# Patient Record
Sex: MALE | Race: OTHER | URBAN - METROPOLITAN AREA
[De-identification: names, ages, dates, MRNs, and addresses within clinical notes are randomized per-mention and may not be internally consistent; named-entity substitution may affect disease eponyms.]

---

## 2019-07-16 ENCOUNTER — INPATIENT (INPATIENT)
Facility: HOSPITAL | Age: 54
LOS: 6 days | Discharge: HOME | End: 2019-07-23
Attending: SURGERY | Admitting: SURGERY
Payer: OTHER MISCELLANEOUS

## 2019-07-16 VITALS
SYSTOLIC BLOOD PRESSURE: 141 MMHG | OXYGEN SATURATION: 96 % | HEART RATE: 78 BPM | DIASTOLIC BLOOD PRESSURE: 68 MMHG | TEMPERATURE: 98 F | RESPIRATION RATE: 18 BRPM
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
## 2019-07-16 DIAGNOSIS — Q72.811 CONGENITAL SHORTENING OF RIGHT LOWER LIMB: ICD-10-CM
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
## 2019-07-16 LAB
ALBUMIN SERPL ELPH-MCNC: 4.5 G/DL — SIGNIFICANT CHANGE UP (ref 3.5–5.2)
ALP SERPL-CCNC: 89 U/L — SIGNIFICANT CHANGE UP (ref 30–115)
ALT FLD-CCNC: 18 U/L — SIGNIFICANT CHANGE UP (ref 0–41)
ANION GAP SERPL CALC-SCNC: 14 MMOL/L — SIGNIFICANT CHANGE UP (ref 7–14)
APTT BLD: 25.6 SEC — LOW (ref 27–39.2)
AST SERPL-CCNC: 36 U/L — SIGNIFICANT CHANGE UP (ref 0–41)
BASOPHILS # BLD AUTO: 0.05 K/UL — SIGNIFICANT CHANGE UP (ref 0–0.2)
BASOPHILS NFR BLD AUTO: 0.4 % — SIGNIFICANT CHANGE UP (ref 0–1)
BILIRUB SERPL-MCNC: 0.3 MG/DL — SIGNIFICANT CHANGE UP (ref 0.2–1.2)
BLD GP AB SCN SERPL QL: SIGNIFICANT CHANGE UP
BUN SERPL-MCNC: 12 MG/DL — SIGNIFICANT CHANGE UP (ref 10–20)
CALCIUM SERPL-MCNC: 9.1 MG/DL — SIGNIFICANT CHANGE UP (ref 8.5–10.1)
CHLORIDE SERPL-SCNC: 106 MMOL/L — SIGNIFICANT CHANGE UP (ref 98–110)
CO2 SERPL-SCNC: 21 MMOL/L — SIGNIFICANT CHANGE UP (ref 17–32)
CREAT SERPL-MCNC: 0.9 MG/DL — SIGNIFICANT CHANGE UP (ref 0.7–1.5)
EOSINOPHIL # BLD AUTO: 0.1 K/UL — SIGNIFICANT CHANGE UP (ref 0–0.7)
EOSINOPHIL NFR BLD AUTO: 0.9 % — SIGNIFICANT CHANGE UP (ref 0–8)
ETHANOL SERPL-MCNC: <10 MG/DL — SIGNIFICANT CHANGE UP
GLUCOSE SERPL-MCNC: 137 MG/DL — HIGH (ref 70–99)
HCT VFR BLD CALC: 42.2 % — SIGNIFICANT CHANGE UP (ref 42–52)
HGB BLD-MCNC: 14.2 G/DL — SIGNIFICANT CHANGE UP (ref 14–18)
IMM GRANULOCYTES NFR BLD AUTO: 0.4 % — HIGH (ref 0.1–0.3)
INR BLD: 0.96 RATIO — SIGNIFICANT CHANGE UP (ref 0.65–1.3)
LACTATE SERPL-SCNC: 2.1 MMOL/L — SIGNIFICANT CHANGE UP (ref 0.5–2.2)
LIDOCAIN IGE QN: 28 U/L — SIGNIFICANT CHANGE UP (ref 7–60)
LYMPHOCYTES # BLD AUTO: 1.31 K/UL — SIGNIFICANT CHANGE UP (ref 1.2–3.4)
LYMPHOCYTES # BLD AUTO: 11.2 % — LOW (ref 20.5–51.1)
MCHC RBC-ENTMCNC: 28.8 PG — SIGNIFICANT CHANGE UP (ref 27–31)
MCHC RBC-ENTMCNC: 33.6 G/DL — SIGNIFICANT CHANGE UP (ref 32–37)
MCV RBC AUTO: 85.6 FL — SIGNIFICANT CHANGE UP (ref 80–94)
MONOCYTES # BLD AUTO: 0.81 K/UL — HIGH (ref 0.1–0.6)
MONOCYTES NFR BLD AUTO: 6.9 % — SIGNIFICANT CHANGE UP (ref 1.7–9.3)
NEUTROPHILS # BLD AUTO: 9.41 K/UL — HIGH (ref 1.4–6.5)
NEUTROPHILS NFR BLD AUTO: 80.2 % — HIGH (ref 42.2–75.2)
NRBC # BLD: 0 /100 WBCS — SIGNIFICANT CHANGE UP (ref 0–0)
PLATELET # BLD AUTO: 271 K/UL — SIGNIFICANT CHANGE UP (ref 130–400)
POTASSIUM SERPL-MCNC: 5.3 MMOL/L — HIGH (ref 3.5–5)
POTASSIUM SERPL-SCNC: 5.3 MMOL/L — HIGH (ref 3.5–5)
PROT SERPL-MCNC: 7.3 G/DL — SIGNIFICANT CHANGE UP (ref 6–8)
PROTHROM AB SERPL-ACNC: 11 SEC — SIGNIFICANT CHANGE UP (ref 9.95–12.87)
RBC # BLD: 4.93 M/UL — SIGNIFICANT CHANGE UP (ref 4.7–6.1)
RBC # FLD: 12.8 % — SIGNIFICANT CHANGE UP (ref 11.5–14.5)
SODIUM SERPL-SCNC: 141 MMOL/L — SIGNIFICANT CHANGE UP (ref 135–146)
WBC # BLD: 11.73 K/UL — HIGH (ref 4.8–10.8)
WBC # FLD AUTO: 11.73 K/UL — HIGH (ref 4.8–10.8)

## 2019-07-16 PROCEDURE — 72170 X-RAY EXAM OF PELVIS: CPT | Mod: 26

## 2019-07-16 PROCEDURE — 73562 X-RAY EXAM OF KNEE 3: CPT | Mod: 26,50

## 2019-07-16 PROCEDURE — 71045 X-RAY EXAM CHEST 1 VIEW: CPT | Mod: 26

## 2019-07-16 PROCEDURE — 99285 EMERGENCY DEPT VISIT HI MDM: CPT

## 2019-07-16 PROCEDURE — 73630 X-RAY EXAM OF FOOT: CPT | Mod: 26,50

## 2019-07-16 PROCEDURE — 73610 X-RAY EXAM OF ANKLE: CPT | Mod: 26,50

## 2019-07-16 PROCEDURE — 72100 X-RAY EXAM L-S SPINE 2/3 VWS: CPT | Mod: 26

## 2019-07-16 RX ORDER — PANTOPRAZOLE SODIUM 20 MG/1
40 TABLET, DELAYED RELEASE ORAL
Refills: 0 | Status: DISCONTINUED | OUTPATIENT
Start: 2019-07-16 | End: 2019-07-16

## 2019-07-16 RX ORDER — HEPARIN SODIUM 5000 [USP'U]/ML
5000 INJECTION INTRAVENOUS; SUBCUTANEOUS EVERY 12 HOURS
Refills: 0 | Status: DISCONTINUED | OUTPATIENT
Start: 2019-07-16 | End: 2019-07-23

## 2019-07-16 RX ORDER — IBUPROFEN 200 MG
800 TABLET ORAL ONCE
Refills: 0 | Status: COMPLETED | OUTPATIENT
Start: 2019-07-16 | End: 2019-07-16

## 2019-07-16 RX ORDER — ACETAMINOPHEN 500 MG
325 TABLET ORAL EVERY 6 HOURS
Refills: 0 | Status: DISCONTINUED | OUTPATIENT
Start: 2019-07-16 | End: 2019-07-17

## 2019-07-16 RX ORDER — PANTOPRAZOLE SODIUM 20 MG/1
40 TABLET, DELAYED RELEASE ORAL
Refills: 0 | Status: DISCONTINUED | OUTPATIENT
Start: 2019-07-16 | End: 2019-07-23

## 2019-07-16 RX ORDER — OXYCODONE AND ACETAMINOPHEN 5; 325 MG/1; MG/1
1 TABLET ORAL EVERY 6 HOURS
Refills: 0 | Status: DISCONTINUED | OUTPATIENT
Start: 2019-07-16 | End: 2019-07-17

## 2019-07-16 RX ORDER — IBUPROFEN 200 MG
200 TABLET ORAL EVERY 8 HOURS
Refills: 0 | Status: DISCONTINUED | OUTPATIENT
Start: 2019-07-16 | End: 2019-07-17

## 2019-07-16 RX ORDER — MORPHINE SULFATE 50 MG/1
4 CAPSULE, EXTENDED RELEASE ORAL ONCE
Refills: 0 | Status: DISCONTINUED | OUTPATIENT
Start: 2019-07-16 | End: 2019-07-16

## 2019-07-16 RX ADMIN — MORPHINE SULFATE 4 MILLIGRAM(S): 50 CAPSULE, EXTENDED RELEASE ORAL at 18:43

## 2019-07-16 RX ADMIN — Medication 800 MILLIGRAM(S): at 21:06

## 2019-07-16 NOTE — ED PROVIDER NOTE - CLINICAL SUMMARY MEDICAL DECISION MAKING FREE TEXT BOX
54 yo man with fall from ladder with bilateral LE injuries.  Will need admission due to inability to ambulate and perform ADL's.   Trauma aware and admitted patient.

## 2019-07-16 NOTE — ED PROVIDER NOTE - PHYSICAL EXAMINATION
CONST: Pt appears to be uncomfortable  EYES: PERRL, EOMI, Sclera and conjunctiva clear.   ENT: No nasal discharge. Oropharynx normal appearing, no erythema or exudates. No abscess or swelling. Uvula midline.   NECK: Non-tender, no meningeal signs. normal ROM. supple   CARD: S1 S2; No jvd  RESP: Equal BS B/L, No wheezes, rhonchi or rales. No distress  GI: Soft, non-tender, non-distended. no cva tenderness. normal BS  MS: Decreased ROM in LE extremities. pulses 2 +. No Midline tenderness  SKIN: Ecchymosis and swelling of b/l ankles  NEURO: A&Ox3, No focal deficits. Strength 4/5 with no sensory deficits. CONST: Pt appears to be uncomfortable  EYES: PERRL, EOMI, Sclera and conjunctiva clear.   ENT: No nasal discharge. Oropharynx normal appearing, no erythema or exudates. No abscess or swelling. Uvula midline.   NECK: Non-tender, no meningeal signs. normal ROM. supple   CARD: S1 S2; No jvd  RESP: Equal BS B/L, No wheezes, rhonchi or rales. No distress  GI: Soft, non-tender, non-distended. no cva tenderness. normal BS  MS: No LS midline tenderness. Moderate swelling and tenderness R lateral malleolus with ecchymosis tracking lateral dorsum of foot. Moderate L lateral malleolus tenderness and swelling. Unable to bear weight.   SKIN: Ecchymosis and swelling of b/l ankles  NEURO: A&Ox3, No focal deficits. Strength 4/5 with no sensory deficits.

## 2019-07-16 NOTE — ED PROVIDER NOTE - ATTENDING CONTRIBUTION TO CARE
I personally evaluated the patient. I reviewed the Resident’s or Physician Assistant’s note (as assigned above), and agree with the findings and plan except as documented in my note.  52 yo man with fall from ladder about 15 feet and now with bilateral foot and ankle pain and inability to ambulate.  XRAYs reveal fibula fracture and possible calcaneus fracture.  Will need admission due to his inability to ambulate and perform ADL's.  Trauma consulted.  Ortho consulted.  Patient admitted to trauma service.

## 2019-07-16 NOTE — ED PROVIDER NOTE - OBJECTIVE STATEMENT
53y M no pmh presents for evaluation s/p fall. Pt states he was working on a house when fell 15ft landing on his feet and falling forward catching himself with his hands, now presents with B/L foot, ankle pain and R knee pain, worse with movement, no relieving factors, no radiation. Associated swelling and inability to ambulate. 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 Pt states he was working on a house when fell 15ft landing on his feet and falling forward catching himself with his hands, now presents with B/L foot, ankle pain and R knee pain, worse with movement, no relieving factors, no radiation. Associated swelling and inability to ambulate. Denies head injury, back pain, weakness, numbness, n/v

## 2019-07-16 NOTE — ED PROVIDER NOTE - PROGRESS NOTE DETAILS
Discussed with radiology for official reads that are still pending. Walked to radiology reading room for reads, questionable R calcaneal lucency, L distal fibula fracture, medial tibial-mortise widening. Discussed with ortho for consult. Trauma consult placed as well for bilateral lower extremity traumatic injuries requiring admission as patient cannot walk. I was directly involved in the management of this patient. Case was discussed with PA Fellow Chloé Dr. Meyers recommending admission to trauma service.

## 2019-07-16 NOTE — ED PROVIDER NOTE - NS ED ROS FT
Constitutional: (-) fever  Eyes/ENT: (-) blurry vision, (-) epistaxis  Cardiovascular: (-) chest pain, (-) syncope  Respiratory: (-) cough, (-) shortness of breath  Gastrointestinal: (-) vomiting, (-) diarrhea  Musculoskeletal: (+) joint pain, (-) neck pain, (-) back pain  Integumentary: (+) swelling, (-) rash  Neurological: (-) headache, (-) altered mental status  Psychiatric: (-) hallucinations  Allergic/Immunologic: (-) pruritus

## 2019-07-17 LAB
ANION GAP SERPL CALC-SCNC: 13 MMOL/L — SIGNIFICANT CHANGE UP (ref 7–14)
BASOPHILS # BLD AUTO: 0.04 K/UL — SIGNIFICANT CHANGE UP (ref 0–0.2)
BASOPHILS NFR BLD AUTO: 0.5 % — SIGNIFICANT CHANGE UP (ref 0–1)
BUN SERPL-MCNC: 14 MG/DL — SIGNIFICANT CHANGE UP (ref 10–20)
CALCIUM SERPL-MCNC: 9.1 MG/DL — SIGNIFICANT CHANGE UP (ref 8.5–10.1)
CHLORIDE SERPL-SCNC: 103 MMOL/L — SIGNIFICANT CHANGE UP (ref 98–110)
CO2 SERPL-SCNC: 24 MMOL/L — SIGNIFICANT CHANGE UP (ref 17–32)
CREAT SERPL-MCNC: 0.7 MG/DL — SIGNIFICANT CHANGE UP (ref 0.7–1.5)
EOSINOPHIL # BLD AUTO: 0.13 K/UL — SIGNIFICANT CHANGE UP (ref 0–0.7)
EOSINOPHIL NFR BLD AUTO: 1.6 % — SIGNIFICANT CHANGE UP (ref 0–8)
GLUCOSE SERPL-MCNC: 125 MG/DL — HIGH (ref 70–99)
HCT VFR BLD CALC: 37.1 % — LOW (ref 42–52)
HCV AB S/CO SERPL IA: 0.11 S/CO — SIGNIFICANT CHANGE UP (ref 0–0.99)
HCV AB SERPL-IMP: SIGNIFICANT CHANGE UP
HGB BLD-MCNC: 12.6 G/DL — LOW (ref 14–18)
IMM GRANULOCYTES NFR BLD AUTO: 0.4 % — HIGH (ref 0.1–0.3)
LYMPHOCYTES # BLD AUTO: 2.05 K/UL — SIGNIFICANT CHANGE UP (ref 1.2–3.4)
LYMPHOCYTES # BLD AUTO: 25 % — SIGNIFICANT CHANGE UP (ref 20.5–51.1)
MAGNESIUM SERPL-MCNC: 2.2 MG/DL — SIGNIFICANT CHANGE UP (ref 1.8–2.4)
MCHC RBC-ENTMCNC: 29 PG — SIGNIFICANT CHANGE UP (ref 27–31)
MCHC RBC-ENTMCNC: 34 G/DL — SIGNIFICANT CHANGE UP (ref 32–37)
MCV RBC AUTO: 85.5 FL — SIGNIFICANT CHANGE UP (ref 80–94)
MONOCYTES # BLD AUTO: 0.92 K/UL — HIGH (ref 0.1–0.6)
MONOCYTES NFR BLD AUTO: 11.2 % — HIGH (ref 1.7–9.3)
NEUTROPHILS # BLD AUTO: 5.04 K/UL — SIGNIFICANT CHANGE UP (ref 1.4–6.5)
NEUTROPHILS NFR BLD AUTO: 61.3 % — SIGNIFICANT CHANGE UP (ref 42.2–75.2)
NRBC # BLD: 0 /100 WBCS — SIGNIFICANT CHANGE UP (ref 0–0)
PHOSPHATE SERPL-MCNC: 4.1 MG/DL — SIGNIFICANT CHANGE UP (ref 2.1–4.9)
PLATELET # BLD AUTO: 228 K/UL — SIGNIFICANT CHANGE UP (ref 130–400)
POTASSIUM SERPL-MCNC: 3.8 MMOL/L — SIGNIFICANT CHANGE UP (ref 3.5–5)
POTASSIUM SERPL-SCNC: 3.8 MMOL/L — SIGNIFICANT CHANGE UP (ref 3.5–5)
RBC # BLD: 4.34 M/UL — LOW (ref 4.7–6.1)
RBC # FLD: 12.9 % — SIGNIFICANT CHANGE UP (ref 11.5–14.5)
SODIUM SERPL-SCNC: 140 MMOL/L — SIGNIFICANT CHANGE UP (ref 135–146)
WBC # BLD: 8.21 K/UL — SIGNIFICANT CHANGE UP (ref 4.8–10.8)
WBC # FLD AUTO: 8.21 K/UL — SIGNIFICANT CHANGE UP (ref 4.8–10.8)

## 2019-07-17 PROCEDURE — 73590 X-RAY EXAM OF LOWER LEG: CPT | Mod: 26,50

## 2019-07-17 PROCEDURE — 73610 X-RAY EXAM OF ANKLE: CPT | Mod: 26,LT

## 2019-07-17 PROCEDURE — 73552 X-RAY EXAM OF FEMUR 2/>: CPT | Mod: 26,50

## 2019-07-17 PROCEDURE — 99221 1ST HOSP IP/OBS SF/LOW 40: CPT

## 2019-07-17 RX ORDER — ACETAMINOPHEN 500 MG
650 TABLET ORAL EVERY 6 HOURS
Refills: 0 | Status: DISCONTINUED | OUTPATIENT
Start: 2019-07-17 | End: 2019-07-23

## 2019-07-17 RX ORDER — IBUPROFEN 200 MG
600 TABLET ORAL EVERY 6 HOURS
Refills: 0 | Status: DISCONTINUED | OUTPATIENT
Start: 2019-07-17 | End: 2019-07-23

## 2019-07-17 RX ORDER — OXYCODONE HYDROCHLORIDE 5 MG/1
5 TABLET ORAL EVERY 4 HOURS
Refills: 0 | Status: DISCONTINUED | OUTPATIENT
Start: 2019-07-17 | End: 2019-07-23

## 2019-07-17 RX ADMIN — OXYCODONE AND ACETAMINOPHEN 1 TABLET(S): 5; 325 TABLET ORAL at 06:47

## 2019-07-17 RX ADMIN — Medication 600 MILLIGRAM(S): at 17:48

## 2019-07-17 RX ADMIN — OXYCODONE AND ACETAMINOPHEN 1 TABLET(S): 5; 325 TABLET ORAL at 06:20

## 2019-07-17 RX ADMIN — PANTOPRAZOLE SODIUM 40 MILLIGRAM(S): 20 TABLET, DELAYED RELEASE ORAL at 05:48

## 2019-07-17 RX ADMIN — HEPARIN SODIUM 5000 UNIT(S): 5000 INJECTION INTRAVENOUS; SUBCUTANEOUS at 17:48

## 2019-07-17 RX ADMIN — Medication 600 MILLIGRAM(S): at 11:24

## 2019-07-17 RX ADMIN — HEPARIN SODIUM 5000 UNIT(S): 5000 INJECTION INTRAVENOUS; SUBCUTANEOUS at 05:48

## 2019-07-17 NOTE — PROGRESS NOTE ADULT - ASSESSMENT
52 y/o male s/p fall from ladder with left ankle fx and right lateral ankle sprain    assessment of patient was done via  over phone ( ID = [id number], name = Simba). We discussed his injuries, the plan, and any concerns that him or his family had were addressed.     Plan:   - Crutches  - Pain management     - Ortho to follow as an outpatient   - PT

## 2019-07-17 NOTE — PROGRESS NOTE ADULT - SUBJECTIVE AND OBJECTIVE BOX
GENERAL SURGERY PROGRESS NOTE     ***assessment of patient was done via  over phone ( ID = [id number], name = Simba)***    DEV BLAND  53y  Male  Hospital day :  POD:  Procedure:   OVERNIGHT EVENTS: No acute events     T(F): 97 (07-17-19 @ 07:30), Max: 97.9 (07-17-19 @ 02:54)  HR: 72 (07-17-19 @ 07:30) (65 - 78)  BP: 109/60 (07-17-19 @ 07:30) (109/60 - 141/68)  ABP: --  ABP(mean): --  RR: 18 (07-17-19 @ 07:30) (18 - 19)  SpO2: 96% (07-16-19 @ 17:35) (96% - 96%)    DIET/FLUIDS: regular       GI proph:  pantoprazole    Tablet 40 milliGRAM(s) Oral before breakfast    AC/ proph: heparin  Injectable 5000 Unit(s) SubCutaneous every 12 hours    ABx: none    PHYSICAL EXAM:  GENERAL: NAD, well-appearing  CHEST/LUNG: Clear to auscultation bilaterally  HEART: Regular rate and rhythm  ABDOMEN: Soft, Nontender, Nondistended;   EXTREMITIES:  No clubbing, cyanosis, or edema      LABS  Labs:  CAPILLARY BLOOD GLUCOSE                              14.2   11.73 )-----------( 271      ( 16 Jul 2019 18:30 )             42.2       Auto Neutrophil %: 80.2 % (07-16-19 @ 18:30)  Auto Immature Granulocyte %: 0.4 % (07-16-19 @ 18:30)    07-16    141  |  106  |  12  ----------------------------<  137<H>  5.3<H>   |  21  |  0.9      Calcium, Total Serum: 9.1 mg/dL (07-16-19 @ 18:30)      LFTs:             7.3  | 0.3  | 36       ------------------[89      ( 16 Jul 2019 18:30 )  4.5  | x    | 18          Lipase:28     Amylase:x         Lactate, Blood: 2.1 mmol/L (07-16-19 @ 18:30)      Coags:     11.00  ----< 0.96    ( 16 Jul 2019 18:30 )     25.6              Alcohol, Blood: <10 mg/dL (07-16-19 @ 18:30)            RADIOLOGY & ADDITIONAL TESTS:   < from: Xray Ankle Complete 3 Views, Left (07.17.19 @ 05:59) >  Findings/  impression: Since prior patient is status post plaster cast placement   with anatomic reduction for Pearson B distal fibular fracture with   reduction of the medial clear space widening. Overlying cast limits   assessment of fine underlying bone detail.    < end of copied text >

## 2019-07-17 NOTE — CONSULT NOTE ADULT - ASSESSMENT
IMPRESSION: Rehab of right ankle sprain / left ankle fx    PRECAUTIONS: [  ] Cardiac  [  ] Respiratory  [  ] Seizures [  ] Contact Isolation  [  ] Droplet Isolation  [  ] Other    Weight Bearing Status:  NWB LLE                                     WBAT RLE    RECOMMENDATION:    Out of Bed to Chair     DVT/Decubiti Prophylaxis    REHAB PLAN:     [ x  ] Bedside P/T 3-5 times a week   [   ]   Bedside O/T  2-3 times a week             [   ] No Rehab Therapy Indicated                   [   ]  Speech Therapy   Conditioning/ROM                                    ADL  Bed Mobility                                               Conditioning/ROM  Transfers                                                     Bed Mobility  Sitting /Standing Balance                         Transfers                                        Gait Training                                               Sitting/Standing Balance  Stair Training [   ]Applicable                    Home equipment Eval                                                                        Splinting  [   ] Only      GOALS:   ADL   [   ]   Independent                    Transfers  [ x  ] Independent                          Ambulation  [ x  ] Independent     [ x   ] With device                            [   ]  CG                                                         [   ]  CG                                                                  [   ] CG                            [    ] Min A                                                   [   ] Min A                                                              [   ] Min  A          DISCHARGE PLAN:   [   ]  Good candidate for Intensive Rehabilitation/Hospital based                                             Will tolerate 3hrs Intensive Rehab Daily                                       [    ]  Short Term Rehab in Skilled Nursing Facility                                       [   x ]  Home with Outpatient or  services                                         [    ]  Possible Candidate for Intensive Hospital based Rehab

## 2019-07-17 NOTE — CONSULT NOTE ADULT - SUBJECTIVE AND OBJECTIVE BOX
HPI:  53y M with no PMH presents for evaluation s/p fall. Pt states he was working on a house when fell 12ft landing on his feet and falling forward catching himself with his hands, now presents with B/L foot, ankle pain and R knee pain, worse with movement, no relieving factors, no radiation. Associated swelling and inability to ambulate. Denies head injury, back pain, weakness, numbness, n/v (17 Jul 2019 00:16). Trauma w/u + for right ankle sprain and left ankle fx, nwb as per ortho      PAST MEDICAL & SURGICAL HISTORY:  No pertinent past medical history  No significant past surgical history      Hospital Course:    TODAY'S SUBJECTIVE & REVIEW OF SYMPTOMS:     Constitutional WNL   Cardio WNL   Resp WNL   GI WNL  Heme WNL  Endo WNL  Skin WNL  MSK pain  Neuro WNL  Cognitive WNL  Psych WNL      MEDICATIONS  (STANDING):  heparin  Injectable 5000 Unit(s) SubCutaneous every 12 hours  pantoprazole    Tablet 40 milliGRAM(s) Oral before breakfast    MEDICATIONS  (PRN):  acetaminophen   Tablet .. 325 milliGRAM(s) Oral every 6 hours PRN Mild Pain (1 - 3)  ibuprofen  Tablet. 200 milliGRAM(s) Oral every 8 hours PRN Mild Pain (1 - 3)  oxyCODONE    5 mG/acetaminophen 325 mG 1 Tablet(s) Oral every 6 hours PRN Moderate Pain (4 - 6)      FAMILY HISTORY:  No pertinent family history in first degree relatives      Allergies    penicillin (Unknown)    Intolerances        SOCIAL HISTORY:    [  ] Etoh  [  ] Smoking  [  ] Substance abuse     Home Environment:  [  ] Home Alone  [x  ] Lives with Family  [  ] Home Health Aid    Dwelling:  [  ] Apartment  [ x ] Private House  [  ] Adult Home  [  ] Skilled Nursing Facility      [  ] Short Term  [  ] Long Term  [ x ] Stairs       Elevator [  ]    FUNCTIONAL STATUS PTA: (Check all that apply)  Ambulation: [ x  ]Independent    [  ] Dependent     [  ] Non-Ambulatory  Assistive Device: [  ] SA Cane  [  ]  Q Cane  [  ] Walker  [  ]  Wheelchair  ADL : [x  ] Independent  [  ]  Dependent       Vital Signs Last 24 Hrs  T(C): 36.1 (17 Jul 2019 07:30), Max: 36.6 (16 Jul 2019 17:35)  T(F): 97 (17 Jul 2019 07:30), Max: 97.9 (17 Jul 2019 02:54)  HR: 72 (17 Jul 2019 07:30) (65 - 78)  BP: 109/60 (17 Jul 2019 07:30) (109/60 - 141/68)  BP(mean): --  RR: 18 (17 Jul 2019 07:30) (18 - 19)  SpO2: 96% (16 Jul 2019 17:35) (96% - 96%)      PHYSICAL EXAM: Alert & Oriented X3  GENERAL: NAD, well-groomed, well-developed  HEAD:  Atraumatic, Normocephalic  CHEST/LUNG: Clear   HEART: S1S2+  ABDOMEN: Soft, Nontender  EXTREMITIES:  no calf tenderness    NERVOUS SYSTEM:  Cranial Nerves 2-12 intact [  ] Abnormal  [  ]  ROM: WFL all extremities [  ]  Abnormal [ x ]limited shraddha ankle  Motor Strength: WFL all extremities  [  ]  Abnormal [x  ]limited shraddha ankle  Sensation: intact to light touch [ x ] Abnormal [  ]  Reflexes: Symmetric [  ]  Abnormal [  ]    FUNCTIONAL STATUS:  Bed Mobility: Independent [  ]  Supervision [  ]  Needs Assistance [ x ]  N/A [  ]  Transfers: Independent [  ]  Supervision [  ]  Needs Assistance [x  ]  N/A [  ]   Ambulation: Independent [  ]  Supervision [  ]  Needs Assistance [  ]  N/A [  ]  ADL: Independent [  ] Requires Assistance [  ] N/A [  ]      LABS:                        14.2   11.73 )-----------( 271      ( 16 Jul 2019 18:30 )             42.2     07-16    141  |  106  |  12  ----------------------------<  137<H>  5.3<H>   |  21  |  0.9    Ca    9.1      16 Jul 2019 18:30    TPro  7.3  /  Alb  4.5  /  TBili  0.3  /  DBili  x   /  AST  36  /  ALT  18  /  AlkPhos  89  07-16    PT/INR - ( 16 Jul 2019 18:30 )   PT: 11.00 sec;   INR: 0.96 ratio         PTT - ( 16 Jul 2019 18:30 )  PTT:25.6 sec      RADIOLOGY & ADDITIONAL STUDIES:    Assesment:

## 2019-07-17 NOTE — CONSULT NOTE ADULT - SUBJECTIVE AND OBJECTIVE BOX
53M who presents to ED after fall. States he was working on a house while on a ladder when he leaned over too much and fell off his ladder. He states he landed on his feet. Denies head injury or other injuries. Endorses pain in lateral aspect of R ankle and pain throughout left ankle. Denies pain anywhere else. Of note, patient has pes cavus deformity of R foot at baseline. Denies numbness/tingling. Orthopedics consulted for ankle injuries.    PAST MEDICAL & SURGICAL HISTORY:  No pertinent past medical history  No significant past surgical history    Home Medications: denies    Allergies  penicillin (Unknown)    Phys Ex:  NAD, alert and oriented    RLE  pes cavus deformity  skin intact, swelling along lateral aspect of ankle  ecchymosis along lateral ankle  TTP along lateral aspect, no TTP anteriorly, posteriorly, medially  no TTP along tibia  SILT s/s/sp/dp/t  +EHL/FHL/EDC  DP 2+    LLE  ankle swelling, skin intact  TTP medially and laterally  SILT s/s/sp/dp/t  +EHL/FHL/EDC  DP 2+    Imaging: left ankle SER fx      A/P: 53M w/ L ankle fx, R lateral ankle sprain    AO splint applied  NWB LLE, splint precautions  RLE cam boot, acewrap for now until cam boot arrives; WBAT  pain control  pre-op trauma/medical clearance  EKG  CXR  pre-op labs  plan for OR if patient remains admitted 53M who presents to ED after fall. States he was working on a house while on a ladder when he leaned over too much and fell off his ladder. He states he landed on his feet. Denies head injury or other injuries. Endorses pain in lateral aspect of R ankle and pain throughout left ankle. Denies pain anywhere else. Of note, patient has pes cavus deformity of R foot at baseline. Denies numbness/tingling. Orthopedics consulted for ankle injuries.    PAST MEDICAL & SURGICAL HISTORY:  No pertinent past medical history  No significant past surgical history    Home Medications: denies    Allergies  penicillin (Unknown)    Phys Ex:  NAD, alert and oriented    RLE  pes cavus deformity  skin intact, swelling along lateral aspect of ankle  ecchymosis along lateral ankle  TTP along lateral aspect, no TTP anteriorly, posteriorly, medially  no TTP along tibia  SILT s/s/sp/dp/t  +EHL/FHL/EDC  DP 2+    LLE  ankle swelling, skin intact  TTP medially and laterally  SILT s/s/sp/dp/t  +EHL/FHL/EDC  DP 2+    Imaging: left ankle SER fx      A/P: 53M w/ L ankle fx, R lateral ankle sprain    AO splint applied  NWB LLE, splint precautions  WBAT RLE  crutch training  pain control  plan for OR if patient remains admitted, otherwise can plan for surgery as outpt 53M who presents to ED after fall. States he was working on a house while on a ladder when he leaned over too much and fell off his ladder. He states he landed on his feet. Denies head injury or other injuries. Endorses pain in lateral aspect of R ankle and pain throughout left ankle. Denies pain anywhere else. Of note, patient has pes cavus deformity of R foot at baseline. Denies numbness/tingling. Orthopedics consulted for ankle injuries.    PAST MEDICAL & SURGICAL HISTORY:  No pertinent past medical history  No significant past surgical history    Home Medications: denies    Allergies  penicillin (Unknown)    Phys Ex:  NAD, alert and oriented    RLE  pes cavus deformity  skin intact, swelling along lateral aspect of ankle  ecchymosis along lateral ankle  TTP along lateral aspect, no TTP anteriorly, posteriorly, medially  no TTP along tibia  SILT s/s/sp/dp/t  +EHL/FHL/EDC  DP 2+    LLE  ankle swelling, skin intact  TTP medially and laterally  SILT s/s/sp/dp/t  +EHL/FHL/EDC  DP 2+    Imaging: left ankle SER fx      A/P: 53M w/ L ankle fx, R lateral ankle sprain    AO splint applied  NWB LLE, splint precautions  WBAT RLE  crutch training  pain control  plan for OR as outpt 53M who presents to ED after fall. States he was working on a house while on a ladder when he leaned over too much and fell off his ladder. He states he landed on his feet. Denies head injury or other injuries. Endorses pain in lateral aspect of R ankle and pain throughout left ankle. Denies pain anywhere else. Of note, patient has pes cavus deformity of R foot at baseline. Denies numbness/tingling. Orthopedics consulted for ankle injuries.    PAST MEDICAL & SURGICAL HISTORY:  No pertinent past medical history  No significant past surgical history    Home Medications: denies    Allergies  penicillin (Unknown)    Phys Ex:  NAD, alert and oriented    RLE  pes cavus deformity  skin intact, swelling along lateral aspect of ankle  ecchymosis along lateral ankle  TTP along lateral aspect, no TTP anteriorly, posteriorly, medially  no TTP along tibia  SILT s/s/sp/dp/t  +EHL/FHL/EDC  DP 2+    LLE  ankle swelling, skin intact  TTP medially and laterally  SILT s/s/sp/dp/t  +EHL/FHL/EDC  DP 2+    Imaging: left ankle SER fx      A/P: 53M w/ L ankle fx, R lateral ankle sprain NON OP CALCANEUS AND NAVICULAR FX    AO splint applied  NWB LLE, splint precautions  NWB RLE CAM BOOT MAY BE RXED FROM [facility]   WHEEL CHAIR TRANSFERS FOR NOW   pain control  plan for OR as outpt

## 2019-07-17 NOTE — H&P ADULT - NSHPPHYSICALEXAM_GEN_ALL_CORE
PHYSICAL EXAM:  GENERAL: NAD, well-appearing  CHEST/LUNG: Clear to auscultation bilaterally  HEART: Regular rate and rhythm  ABDOMEN: Soft, Nontender, Nondistended;   EXTREMITIES:  No clubbing, cyanosis, or edema. B/L ankle swelling and tenderness PHYSICAL EXAM:  GENERAL: NAD, well-appearing  CHEST/LUNG: Clear to auscultation bilaterally  HEART: Regular rate and rhythm  ABDOMEN: Soft, Nontender, Nondistended;   EXTREMITIES:  No clubbing, cyanosis, or edema. B/L ankle swelling and tenderness and eccymyosis

## 2019-07-17 NOTE — H&P ADULT - ATTENDING COMMENTS
52yo male presents after falling 12 feet, complains of pain in bilateral feet, ankles, and right knee. Sustained left fibular fracture - splinted, ortho consult. F/U imaging. Pending tertiary survey.

## 2019-07-17 NOTE — H&P ADULT - HISTORY OF PRESENT ILLNESS
53y M with no PMH presents for evaluation s/p fall. Pt states he was working on a house when fell 12ft landing on his feet and falling forward catching himself with his hands, now presents with B/L foot, ankle pain and R knee pain, worse with movement, no relieving factors, no radiation. Associated swelling and inability to ambulate. Denies head injury, back pain, weakness, numbness, n/v

## 2019-07-17 NOTE — H&P ADULT - NSHPLABSRESULTS_GEN_ALL_CORE
54 yo M with no PMH s/p fall.     PLAN:     -F/U ortho recommendations  -F/U PT rehab consult  -F/U physiatry consult  -Pain control: Tylenol, Ibuprofen, Oxycodone  -DVT ppx: HSQ  -GI ppx: protonix 54 yo M with no PMH s/p fall.     PLAN:     -F/U ortho recommendations  -F/U PT rehab consult  -F/U physiatry consult  -Pain control: Tylenol, Ibuprofen, Oxycodone  -DVT ppx: HSQ  -GI ppx: protonix    Senior Trauma Resident Note  54 yo male sp fall off ladder 15 ft on legs -ht -loc -ac  left fibular fx - fu ortho (at bedside ) , splinted   right ankle swelling - f/u ortho  pain control pt rehab  to floor  Airway intact  Bilateral Breath Sounds  Palpable pulses in 4 ext  GCS 15, PERRL, WALTON  VSS  No Subq emphysema, abdominal tenderness,  or pelvic instability   CXR and PXR negative  Will Dispo accordingly  Plan as above d/w Dr Ayo Meyers

## 2019-07-17 NOTE — PATIENT PROFILE ADULT - LANGUAGE ASSISTANCE NEEDED
No-Patient/Caregiver offered and refused free interpretation services./Pt refused, request daughter Lela at bedside for translation

## 2019-07-18 LAB
ANION GAP SERPL CALC-SCNC: 11 MMOL/L — SIGNIFICANT CHANGE UP (ref 7–14)
BASOPHILS # BLD AUTO: 0.04 K/UL — SIGNIFICANT CHANGE UP (ref 0–0.2)
BASOPHILS NFR BLD AUTO: 0.5 % — SIGNIFICANT CHANGE UP (ref 0–1)
BUN SERPL-MCNC: 13 MG/DL — SIGNIFICANT CHANGE UP (ref 10–20)
CALCIUM SERPL-MCNC: 9.1 MG/DL — SIGNIFICANT CHANGE UP (ref 8.5–10.1)
CHLORIDE SERPL-SCNC: 104 MMOL/L — SIGNIFICANT CHANGE UP (ref 98–110)
CO2 SERPL-SCNC: 24 MMOL/L — SIGNIFICANT CHANGE UP (ref 17–32)
CREAT SERPL-MCNC: 0.7 MG/DL — SIGNIFICANT CHANGE UP (ref 0.7–1.5)
EOSINOPHIL # BLD AUTO: 0.23 K/UL — SIGNIFICANT CHANGE UP (ref 0–0.7)
EOSINOPHIL NFR BLD AUTO: 3 % — SIGNIFICANT CHANGE UP (ref 0–8)
GLUCOSE SERPL-MCNC: 113 MG/DL — HIGH (ref 70–99)
HCT VFR BLD CALC: 36.3 % — LOW (ref 42–52)
HGB BLD-MCNC: 12.2 G/DL — LOW (ref 14–18)
IMM GRANULOCYTES NFR BLD AUTO: 0.3 % — SIGNIFICANT CHANGE UP (ref 0.1–0.3)
LYMPHOCYTES # BLD AUTO: 2.35 K/UL — SIGNIFICANT CHANGE UP (ref 1.2–3.4)
LYMPHOCYTES # BLD AUTO: 30.6 % — SIGNIFICANT CHANGE UP (ref 20.5–51.1)
MAGNESIUM SERPL-MCNC: 2.2 MG/DL — SIGNIFICANT CHANGE UP (ref 1.8–2.4)
MCHC RBC-ENTMCNC: 29 PG — SIGNIFICANT CHANGE UP (ref 27–31)
MCHC RBC-ENTMCNC: 33.6 G/DL — SIGNIFICANT CHANGE UP (ref 32–37)
MCV RBC AUTO: 86.4 FL — SIGNIFICANT CHANGE UP (ref 80–94)
MONOCYTES # BLD AUTO: 0.83 K/UL — HIGH (ref 0.1–0.6)
MONOCYTES NFR BLD AUTO: 10.8 % — HIGH (ref 1.7–9.3)
NEUTROPHILS # BLD AUTO: 4.22 K/UL — SIGNIFICANT CHANGE UP (ref 1.4–6.5)
NEUTROPHILS NFR BLD AUTO: 54.8 % — SIGNIFICANT CHANGE UP (ref 42.2–75.2)
NRBC # BLD: 0 /100 WBCS — SIGNIFICANT CHANGE UP (ref 0–0)
PHOSPHATE SERPL-MCNC: 3.5 MG/DL — SIGNIFICANT CHANGE UP (ref 2.1–4.9)
PLATELET # BLD AUTO: 226 K/UL — SIGNIFICANT CHANGE UP (ref 130–400)
POTASSIUM SERPL-MCNC: 4.1 MMOL/L — SIGNIFICANT CHANGE UP (ref 3.5–5)
POTASSIUM SERPL-SCNC: 4.1 MMOL/L — SIGNIFICANT CHANGE UP (ref 3.5–5)
RBC # BLD: 4.2 M/UL — LOW (ref 4.7–6.1)
RBC # FLD: 12.8 % — SIGNIFICANT CHANGE UP (ref 11.5–14.5)
SODIUM SERPL-SCNC: 139 MMOL/L — SIGNIFICANT CHANGE UP (ref 135–146)
WBC # BLD: 7.69 K/UL — SIGNIFICANT CHANGE UP (ref 4.8–10.8)
WBC # FLD AUTO: 7.69 K/UL — SIGNIFICANT CHANGE UP (ref 4.8–10.8)

## 2019-07-18 PROCEDURE — 73700 CT LOWER EXTREMITY W/O DYE: CPT | Mod: 26,RT,76

## 2019-07-18 RX ADMIN — OXYCODONE HYDROCHLORIDE 5 MILLIGRAM(S): 5 TABLET ORAL at 13:22

## 2019-07-18 RX ADMIN — Medication 600 MILLIGRAM(S): at 23:56

## 2019-07-18 RX ADMIN — Medication 650 MILLIGRAM(S): at 23:56

## 2019-07-18 RX ADMIN — Medication 600 MILLIGRAM(S): at 11:03

## 2019-07-18 RX ADMIN — Medication 650 MILLIGRAM(S): at 01:50

## 2019-07-18 RX ADMIN — Medication 650 MILLIGRAM(S): at 05:47

## 2019-07-18 RX ADMIN — Medication 600 MILLIGRAM(S): at 05:46

## 2019-07-18 RX ADMIN — HEPARIN SODIUM 5000 UNIT(S): 5000 INJECTION INTRAVENOUS; SUBCUTANEOUS at 05:46

## 2019-07-18 RX ADMIN — OXYCODONE HYDROCHLORIDE 5 MILLIGRAM(S): 5 TABLET ORAL at 12:52

## 2019-07-18 RX ADMIN — Medication 600 MILLIGRAM(S): at 18:11

## 2019-07-18 RX ADMIN — Medication 600 MILLIGRAM(S): at 01:20

## 2019-07-18 RX ADMIN — Medication 600 MILLIGRAM(S): at 01:50

## 2019-07-18 RX ADMIN — Medication 600 MILLIGRAM(S): at 06:07

## 2019-07-18 RX ADMIN — Medication 650 MILLIGRAM(S): at 01:20

## 2019-07-18 RX ADMIN — PANTOPRAZOLE SODIUM 40 MILLIGRAM(S): 20 TABLET, DELAYED RELEASE ORAL at 05:46

## 2019-07-18 RX ADMIN — HEPARIN SODIUM 5000 UNIT(S): 5000 INJECTION INTRAVENOUS; SUBCUTANEOUS at 18:12

## 2019-07-18 RX ADMIN — Medication 650 MILLIGRAM(S): at 06:07

## 2019-07-18 NOTE — PHYSICAL THERAPY INITIAL EVALUATION ADULT - LEVEL OF INDEPENDENCE: SIT/STAND, REHAB EVAL
minimum assist (75% patients effort)/attempted to ambulate with pt twice today in am pt was in too much pain - coordinated with ISABELLA Teixeira to give pain meds and see pt when pain meds are in affect. pt seen in PM pt able to stand using AC but was highly unsteady, difficulty maintaining NWB on LLE using AC. pt performed STS better using RW and was able to maintain proper WB status. pt has a right pes cavus deformity and his right leg is shorter than the left. pt endorses severe pain when weight bearing on RLE. RN aware. Spoke to trauma team [phone] who said they will alter pateints pain meds and try PT again tomorrow. PT dept will follow up.

## 2019-07-18 NOTE — PHYSICAL THERAPY INITIAL EVALUATION ADULT - GENERAL OBSERVATIONS, REHAB EVAL
pt encountered supine in bed in NAD, ace wraps on BL ankles, pt has a leg length discrepancy left LLE is longer than right. right pes cavus deformity

## 2019-07-18 NOTE — CHART NOTE - NSCHARTNOTEFT_GEN_A_CORE
Spoke with patient using  number [id number]. Patient was unable to work with PT today due to NWB status of LLE and severe pain in the heel of his left foot. He is concerned about being able to return to work once his injuries have healed. Patient is amenable to working with PT as long as his pain is tolerable.

## 2019-07-19 LAB
ANION GAP SERPL CALC-SCNC: 13 MMOL/L — SIGNIFICANT CHANGE UP (ref 7–14)
BASOPHILS # BLD AUTO: 0.03 K/UL — SIGNIFICANT CHANGE UP (ref 0–0.2)
BASOPHILS NFR BLD AUTO: 0.5 % — SIGNIFICANT CHANGE UP (ref 0–1)
BUN SERPL-MCNC: 14 MG/DL — SIGNIFICANT CHANGE UP (ref 10–20)
CALCIUM SERPL-MCNC: 9.1 MG/DL — SIGNIFICANT CHANGE UP (ref 8.5–10.1)
CHLORIDE SERPL-SCNC: 106 MMOL/L — SIGNIFICANT CHANGE UP (ref 98–110)
CO2 SERPL-SCNC: 23 MMOL/L — SIGNIFICANT CHANGE UP (ref 17–32)
CREAT SERPL-MCNC: 0.8 MG/DL — SIGNIFICANT CHANGE UP (ref 0.7–1.5)
EOSINOPHIL # BLD AUTO: 0.18 K/UL — SIGNIFICANT CHANGE UP (ref 0–0.7)
EOSINOPHIL NFR BLD AUTO: 3 % — SIGNIFICANT CHANGE UP (ref 0–8)
GLUCOSE SERPL-MCNC: 137 MG/DL — HIGH (ref 70–99)
HCT VFR BLD CALC: 36.1 % — LOW (ref 42–52)
HGB BLD-MCNC: 12.4 G/DL — LOW (ref 14–18)
IMM GRANULOCYTES NFR BLD AUTO: 0.3 % — SIGNIFICANT CHANGE UP (ref 0.1–0.3)
LYMPHOCYTES # BLD AUTO: 2.08 K/UL — SIGNIFICANT CHANGE UP (ref 1.2–3.4)
LYMPHOCYTES # BLD AUTO: 35 % — SIGNIFICANT CHANGE UP (ref 20.5–51.1)
MAGNESIUM SERPL-MCNC: 2.1 MG/DL — SIGNIFICANT CHANGE UP (ref 1.8–2.4)
MCHC RBC-ENTMCNC: 29.3 PG — SIGNIFICANT CHANGE UP (ref 27–31)
MCHC RBC-ENTMCNC: 34.3 G/DL — SIGNIFICANT CHANGE UP (ref 32–37)
MCV RBC AUTO: 85.3 FL — SIGNIFICANT CHANGE UP (ref 80–94)
MONOCYTES # BLD AUTO: 0.63 K/UL — HIGH (ref 0.1–0.6)
MONOCYTES NFR BLD AUTO: 10.6 % — HIGH (ref 1.7–9.3)
NEUTROPHILS # BLD AUTO: 3.01 K/UL — SIGNIFICANT CHANGE UP (ref 1.4–6.5)
NEUTROPHILS NFR BLD AUTO: 50.6 % — SIGNIFICANT CHANGE UP (ref 42.2–75.2)
NRBC # BLD: 0 /100 WBCS — SIGNIFICANT CHANGE UP (ref 0–0)
PHOSPHATE SERPL-MCNC: 3.8 MG/DL — SIGNIFICANT CHANGE UP (ref 2.1–4.9)
PLATELET # BLD AUTO: 269 K/UL — SIGNIFICANT CHANGE UP (ref 130–400)
POTASSIUM SERPL-MCNC: 4.2 MMOL/L — SIGNIFICANT CHANGE UP (ref 3.5–5)
POTASSIUM SERPL-SCNC: 4.2 MMOL/L — SIGNIFICANT CHANGE UP (ref 3.5–5)
RBC # BLD: 4.23 M/UL — LOW (ref 4.7–6.1)
RBC # FLD: 12.7 % — SIGNIFICANT CHANGE UP (ref 11.5–14.5)
SODIUM SERPL-SCNC: 142 MMOL/L — SIGNIFICANT CHANGE UP (ref 135–146)
WBC # BLD: 5.95 K/UL — SIGNIFICANT CHANGE UP (ref 4.8–10.8)
WBC # FLD AUTO: 5.95 K/UL — SIGNIFICANT CHANGE UP (ref 4.8–10.8)

## 2019-07-19 PROCEDURE — 99221 1ST HOSP IP/OBS SF/LOW 40: CPT

## 2019-07-19 PROCEDURE — 99232 SBSQ HOSP IP/OBS MODERATE 35: CPT

## 2019-07-19 RX ADMIN — Medication 650 MILLIGRAM(S): at 18:04

## 2019-07-19 RX ADMIN — Medication 600 MILLIGRAM(S): at 12:49

## 2019-07-19 RX ADMIN — Medication 650 MILLIGRAM(S): at 05:55

## 2019-07-19 RX ADMIN — PANTOPRAZOLE SODIUM 40 MILLIGRAM(S): 20 TABLET, DELAYED RELEASE ORAL at 05:55

## 2019-07-19 RX ADMIN — HEPARIN SODIUM 5000 UNIT(S): 5000 INJECTION INTRAVENOUS; SUBCUTANEOUS at 18:05

## 2019-07-19 RX ADMIN — Medication 650 MILLIGRAM(S): at 12:49

## 2019-07-19 RX ADMIN — HEPARIN SODIUM 5000 UNIT(S): 5000 INJECTION INTRAVENOUS; SUBCUTANEOUS at 05:55

## 2019-07-19 RX ADMIN — Medication 600 MILLIGRAM(S): at 05:55

## 2019-07-19 RX ADMIN — Medication 600 MILLIGRAM(S): at 12:50

## 2019-07-19 RX ADMIN — Medication 650 MILLIGRAM(S): at 18:05

## 2019-07-19 RX ADMIN — Medication 600 MILLIGRAM(S): at 18:04

## 2019-07-19 RX ADMIN — Medication 600 MILLIGRAM(S): at 18:05

## 2019-07-19 NOTE — CONSULT NOTE ADULT - SUBJECTIVE AND OBJECTIVE BOX
PODIATRY PROGRESS NOTE   [id number] DEV BLAND is a pleasant well-nourished, well developed 54y Male in no acute distress, alert awake, and oriented to person, place and time.   Patient is a 54y old  Male who presents with a chief complaint of Fall from 12 ft (19 Jul 2019 06:03)    HPI:  53y M with no PMH presents for evaluation s/p fall. Pt states he was working on a house when fell 12ft landing on his feet and falling forward catching himself with his hands, now presents with B/L foot, ankle pain and R knee pain, worse with movement, no relieving factors, no radiation. Associated swelling and inability to ambulate. Denies head injury, back pain, weakness, numbness, n/v (17 Jul 2019 00:16).    Podiatry: Pt. is a Sinhala speaking male who was seen at bedside. Pt's daughter was present. Pt's daughter states that he had fallen from a roof, while working on a house. Pt.'s daughter states that he is having pain on the side of his right ankle. Pt. denies any recent n/v/f/c/sob. Pt. denies any other pedal complaints at this time.      Vital Signs Last 24 Hrs  T(C): 36.2 (19 Jul 2019 08:08), Max: 36.8 (18 Jul 2019 16:09)  T(F): 97.1 (19 Jul 2019 08:08), Max: 98.2 (18 Jul 2019 16:09)  HR: 58 (19 Jul 2019 08:08) (58 - 70)  BP: 124/61 (19 Jul 2019 08:08) (119/71 - 129/73)  BP(mean): --  RR: 18 (19 Jul 2019 08:08) (18 - 18)  SpO2: --                        12.2   7.69  )-----------( 226      ( 18 Jul 2019 22:25 )             36.3                 07-18    139  |  104  |  13  ----------------------------<  113<H>  4.1   |  24  |  0.7    Ca    9.1      18 Jul 2019 22:25  Phos  3.5     07-18  Mg     2.2     07-18    REVIEW OF SYSTEMS:  CONSTITUTIONAL: No weakness, fevers or chills  EYES/ENT: No visual changes;  No vertigo or throat pain   NECK: No pain or stiffness  RESPIRATORY: No cough, wheezing, hemoptysis; No shortness of breath  CARDIOVASCULAR: No chest pain or palpitations  GASTROINTESTINAL: No abdominal or epigastric pain. No nausea, vomiting, or hematemesis; No diarrhea or constipation. No melena or hematochezia.  GENITOURINARY: No dysuria, frequency or hematuria  NEUROLOGICAL: No numbness or weakness  SKIN: Left dorsal foot wound     Physical Exam - Right Lower Extremity Focused:   Derm:   Toenails 1-10 appear mildy dystrophic.     Vascular:   - DP and PT Pulses 2/4 B/L   - cap refill time < 3 seconds B/L  - foot was warm to the touch B/L     Neuro:  - Protective sensation intact     MSK:   -Pain elicited upon palpation at the cuboid and at the posterior aspect of the calcaneus.  - Manual Muscle strength +3/5 in all muscle compartments B/L  - ROM at the ankle joint is limited.    EXAM: CT FOOT ONLY RT   EXAM: CT ANKLE ONLY RT       PROCEDURE DATE: 07/18/2019           INTERPRETATION: CLINICAL HISTORY: Post fall off a ladder landing on the   feet bilaterally, now with extensive swelling and bruising of the bilateral   lower extremities. Evaluate for occult injury.     TECHNIQUE: Images were obtained of the right ankle and foot without   contrast. Coronal and sagittal reformatted images were also provided.     COMPARISON: Correlation is made with right ankle and foot radiographs July 16, 2019.       FINDINGS/   IMPRESSION:     BONES/JOINTS:   Acute comminuted fracture through the calcaneus involving the anterior   process and anterior facet, middle talar articular surface and sustentaculum   edward.     Acute minimally displaced comminuted fracture through the navicular bone,   and avulsion fragments off of the medial cortex of the cuboid, suggestive of   cubonavicular joint disruption.     Avulsion fragments off of the medial cortex of the talus, possibly   representing a deltoid ligament injury. Additional avulsion fragments off of   the medial cortex of the cuboid.     Lisfranc interval is intact.     Right distal tibia and fibula are unremarkable. Ankle mortise is intact. No   osseous defect of the talar dome. Tibiofibular clear spaces maintained.     SOFT TISSUES:   Extensive soft tissue inflammation and edema throughout the right ankle and   foot.       Assessment:   Right ankle sprain    Plan:  - Chart reviewed and Patient evaluated  - Discussed diagnosis and treatment with patient  - Rx Long CAM walker boot  - Pt should remain non-weight bearing   - CT scan reviewed: see above  - Discussed plan w/ Attending

## 2019-07-19 NOTE — PROGRESS NOTE ADULT - ASSESSMENT
54 y/o male s/p fall from ladder with left ankle fx and right ankle/foot fractures    assessment of patient was done via  over phone ( ID = [id number], name = Micaela). We discussed his injuries, the plan, and any concerns that him or his family had were addressed.     Plan:   - Ortho to see patient  - Pain management     - PT/Rehab

## 2019-07-19 NOTE — CONSULT NOTE ADULT - ATTENDING COMMENTS
presence of comminuted calcaneal and navicular fracture. Tenderness at the ATFL. Patient should remain NWB. Prescription for CAM boot placed in chart. Pt is following up with ortho as outpatient
agree w above.  pt seen and examined.  crutch training, if able to ambulate dc for outpt sx, if not will consider while in house

## 2019-07-19 NOTE — PROGRESS NOTE ADULT - SUBJECTIVE AND OBJECTIVE BOX
GENERAL SURGERY PROGRESS NOTE     DEV BLAND  54y  Male  Hospital day :2d  POD:  Procedure:   OVERNIGHT EVENTS:    T(F): 97.9 (07-18-19 @ 23:00), Max: 98.2 (07-18-19 @ 16:09)  HR: 59 (07-18-19 @ 23:00) (59 - 70)  BP: 129/73 (07-18-19 @ 23:00) (108/55 - 129/73)  ABP: --  ABP(mean): --  RR: 18 (07-18-19 @ 23:00) (18 - 18)  SpO2: --    DIET/FLUIDS:   NG:                                                                                DRAINS:     BM:     EMESIS:     URINE:      GI proph:  pantoprazole    Tablet 40 milliGRAM(s) Oral before breakfast    AC/ proph: heparin  Injectable 5000 Unit(s) SubCutaneous every 12 hours    ABx:     PHYSICAL EXAM:  GENERAL: NAD, well-appearing  CHEST/LUNG: Clear to auscultation bilaterally  HEART: Regular rate and rhythm  ABDOMEN: Soft, Nontender, Nondistended;   EXTREMITIES:  No clubbing, cyanosis, or edema      LABS  Labs:  CAPILLARY BLOOD GLUCOSE                              12.2   7.69  )-----------( 226      ( 18 Jul 2019 22:25 )             36.3       Auto Neutrophil %: 54.8 % (07-18-19 @ 22:25)  Auto Immature Granulocyte %: 0.3 % (07-18-19 @ 22:25)    07-18    139  |  104  |  13  ----------------------------<  113<H>  4.1   |  24  |  0.7      Calcium, Total Serum: 9.1 mg/dL (07-18-19 @ 22:25)      LFTs:     Lactate, Blood: 2.1 mmol/L (07-16-19 @ 18:30)      Coags:                    RADIOLOGY & ADDITIONAL TESTS:    < from: CT Ankle No Cont, Right (07.18.19 @ 21:43) >    FINDINGS/  IMPRESSION:    BONES/JOINTS:   Acute comminuted fracture through the calcaneus involving the anterior   process and anterior facet, middle talar articular surface and   sustentaculum edward.    Acute minimally displaced comminuted fracture through the navicular bone,   and avulsion fragments off of the medial cortex of the cuboid, suggestive   of cubonavicular joint disruption.    Avulsion fragments off of the medial cortex of the talus, possibly   representing a deltoid ligament injury. Additional avulsion fragments off   of the medial cortex of the cuboid.    Lisfranc interval is intact.    Right distal tibia and fibula are unremarkable. Ankle mortise is intact.   No osseous defect of the talar dome. Tibiofibular clear spaces maintained.    SOFT TISSUES:   Extensive soft tissue inflammation and edema throughout the right ankle   and foot.    OTHER:   None.        < end of copied text >    A/P GENERAL SURGERY PROGRESS NOTE   ***returned and spoke with patient via  phone at 10:15am*** ( ID  = [id number], name = Micaela)     DEV BLAND  54y  Male  Hospital day :2d  POD:  Procedure:   OVERNIGHT EVENTS: No acute events     Radiograph did not identify fx in RLE however patient was having difficulty ambulating with PT. Patient taken for CT of right foot and ankle which revealed a fracture in the calcaneus, navicular bone, and avulsion fragments from the cuboid and talus.         T(F): 97.9 (07-18-19 @ 23:00), Max: 98.2 (07-18-19 @ 16:09)  HR: 59 (07-18-19 @ 23:00) (59 - 70)  BP: 129/73 (07-18-19 @ 23:00) (108/55 - 129/73)  ABP: --  ABP(mean): --  RR: 18 (07-18-19 @ 23:00) (18 - 18)  SpO2: --        BM: yesterday     EMESIS: none    URINE: regular passing of urine      GI proph:  pantoprazole    Tablet 40 milliGRAM(s) Oral before breakfast    AC/ proph: heparin  Injectable 5000 Unit(s) SubCutaneous every 12 hours    ABx:     PHYSICAL EXAM:  GENERAL: NAD, well-appearing  CHEST/LUNG: Clear to auscultation bilaterally  HEART: Regular rate and rhythm  ABDOMEN: Soft, Nontender, Nondistended;   EXTREMITIES:  No clubbing, cyanosis, or edema      LABS  Labs:  CAPILLARY BLOOD GLUCOSE                              12.2   7.69  )-----------( 226      ( 18 Jul 2019 22:25 )             36.3       Auto Neutrophil %: 54.8 % (07-18-19 @ 22:25)  Auto Immature Granulocyte %: 0.3 % (07-18-19 @ 22:25)    07-18    139  |  104  |  13  ----------------------------<  113<H>  4.1   |  24  |  0.7      Calcium, Total Serum: 9.1 mg/dL (07-18-19 @ 22:25)        RADIOLOGY & ADDITIONAL TESTS:  < from: CT Ankle No Cont, Right (07.18.19 @ 21:43) >  FINDINGS/  IMPRESSION:    BONES/JOINTS:   Acute comminuted fracture through the calcaneus involving the anterior   process and anterior facet, middle talar articular surface and   sustentaculum edward.    Acuteminimally displaced comminuted fracture through the navicular bone,   and avulsion fragments off of the medial cortex of the cuboid, suggestive   of cubonavicular joint disruption.    Avulsion fragments off of the medial cortex of the talus, possibly  representing a deltoid ligament injury. Additional avulsion fragments off   of the medial cortex of the cuboid.    Lisfranc interval is intact.    Right distal tibia and fibula are unremarkable. Ankle mortise is intact.   No osseous defect of the talar dome. Tibiofibular clear spaces maintained.    SOFT TISSUES:   Extensive soft tissue inflammation and edema throughout the right ankle   and foot.    OTHER:   None.    < end of copied text >

## 2019-07-20 PROCEDURE — 99232 SBSQ HOSP IP/OBS MODERATE 35: CPT

## 2019-07-20 RX ADMIN — Medication 600 MILLIGRAM(S): at 00:48

## 2019-07-20 RX ADMIN — Medication 650 MILLIGRAM(S): at 13:06

## 2019-07-20 RX ADMIN — Medication 650 MILLIGRAM(S): at 17:24

## 2019-07-20 RX ADMIN — Medication 600 MILLIGRAM(S): at 13:06

## 2019-07-20 RX ADMIN — PANTOPRAZOLE SODIUM 40 MILLIGRAM(S): 20 TABLET, DELAYED RELEASE ORAL at 05:14

## 2019-07-20 RX ADMIN — Medication 650 MILLIGRAM(S): at 13:07

## 2019-07-20 RX ADMIN — Medication 600 MILLIGRAM(S): at 05:13

## 2019-07-20 RX ADMIN — HEPARIN SODIUM 5000 UNIT(S): 5000 INJECTION INTRAVENOUS; SUBCUTANEOUS at 17:26

## 2019-07-20 RX ADMIN — HEPARIN SODIUM 5000 UNIT(S): 5000 INJECTION INTRAVENOUS; SUBCUTANEOUS at 05:14

## 2019-07-20 RX ADMIN — Medication 600 MILLIGRAM(S): at 13:07

## 2019-07-20 RX ADMIN — Medication 600 MILLIGRAM(S): at 23:59

## 2019-07-20 RX ADMIN — Medication 600 MILLIGRAM(S): at 17:24

## 2019-07-20 RX ADMIN — Medication 650 MILLIGRAM(S): at 00:48

## 2019-07-20 RX ADMIN — Medication 650 MILLIGRAM(S): at 23:59

## 2019-07-20 RX ADMIN — Medication 650 MILLIGRAM(S): at 05:13

## 2019-07-20 NOTE — CHART NOTE - NSCHARTNOTEFT_GEN_A_CORE
At 2pm, I spoke with the patient via  phone ( ID: [id number], name = Shameka). We discussed his current condition, the pending consult/plan from the orthopedics team and eventual discharge planning. All questions the patient had were addressed.

## 2019-07-20 NOTE — PROGRESS NOTE ADULT - SUBJECTIVE AND OBJECTIVE BOX
GENERAL SURGERY PROGRESS NOTE     DEV BLAND  54y  Male  Hospital day :3d  POD:  Procedure:     OVERNIGHT EVENTS:  No acute overnight events. Sleeping comfortably.    T(F): 97.5 (07-19-19 @ 23:52), Max: 98.2 (07-19-19 @ 16:00)  HR: 59 (07-19-19 @ 23:52) (58 - 61)  BP: 146/65 (07-19-19 @ 23:52) (111/58 - 146/65)  ABP: --  ABP(mean): --  RR: 18 (07-19-19 @ 23:52) (18 - 18)  SpO2: --     GI proph:  pantoprazole    Tablet 40 milliGRAM(s) Oral before breakfast    AC/ proph: heparin  Injectable 5000 Unit(s) SubCutaneous every 12 hours      PHYSICAL EXAM:  GENERAL: NAD, well-appearing  CHEST/LUNG: Clear to auscultation bilaterally  HEART: Regular rate and rhythm  ABDOMEN: Soft, Nontender, Nondistended;   EXTREMITIES:  b/l ACE wrapping      LABS  Labs:  CAPILLARY BLOOD GLUCOSE                              12.4   5.95  )-----------( 269      ( 19 Jul 2019 20:48 )             36.1       Auto Neutrophil %: 50.6 % (07-19-19 @ 20:48)  Auto Immature Granulocyte %: 0.3 % (07-19-19 @ 20:48)    07-19    142  |  106  |  14  ----------------------------<  137<H>  4.2   |  23  |  0.8      Calcium, Total Serum: 9.1 mg/dL (07-19-19 @ 20:48)

## 2019-07-21 LAB
ANION GAP SERPL CALC-SCNC: 12 MMOL/L — SIGNIFICANT CHANGE UP (ref 7–14)
APTT BLD: 29.3 SEC — SIGNIFICANT CHANGE UP (ref 27–39.2)
BASOPHILS # BLD AUTO: 0.04 K/UL — SIGNIFICANT CHANGE UP (ref 0–0.2)
BASOPHILS # BLD AUTO: 0.04 K/UL — SIGNIFICANT CHANGE UP (ref 0–0.2)
BASOPHILS NFR BLD AUTO: 0.5 % — SIGNIFICANT CHANGE UP (ref 0–1)
BASOPHILS NFR BLD AUTO: 0.5 % — SIGNIFICANT CHANGE UP (ref 0–1)
BUN SERPL-MCNC: 14 MG/DL — SIGNIFICANT CHANGE UP (ref 10–20)
CALCIUM SERPL-MCNC: 9.3 MG/DL — SIGNIFICANT CHANGE UP (ref 8.5–10.1)
CHLORIDE SERPL-SCNC: 104 MMOL/L — SIGNIFICANT CHANGE UP (ref 98–110)
CO2 SERPL-SCNC: 23 MMOL/L — SIGNIFICANT CHANGE UP (ref 17–32)
CREAT SERPL-MCNC: 0.7 MG/DL — SIGNIFICANT CHANGE UP (ref 0.7–1.5)
EOSINOPHIL # BLD AUTO: 0.17 K/UL — SIGNIFICANT CHANGE UP (ref 0–0.7)
EOSINOPHIL # BLD AUTO: 0.21 K/UL — SIGNIFICANT CHANGE UP (ref 0–0.7)
EOSINOPHIL NFR BLD AUTO: 2.1 % — SIGNIFICANT CHANGE UP (ref 0–8)
EOSINOPHIL NFR BLD AUTO: 2.4 % — SIGNIFICANT CHANGE UP (ref 0–8)
GLUCOSE SERPL-MCNC: 154 MG/DL — HIGH (ref 70–99)
HCT VFR BLD CALC: 36.8 % — LOW (ref 42–52)
HCT VFR BLD CALC: 38.9 % — LOW (ref 42–52)
HGB BLD-MCNC: 12.5 G/DL — LOW (ref 14–18)
HGB BLD-MCNC: 12.9 G/DL — LOW (ref 14–18)
IMM GRANULOCYTES NFR BLD AUTO: 0.2 % — SIGNIFICANT CHANGE UP (ref 0.1–0.3)
IMM GRANULOCYTES NFR BLD AUTO: 0.4 % — HIGH (ref 0.1–0.3)
INR BLD: 0.99 RATIO — SIGNIFICANT CHANGE UP (ref 0.65–1.3)
LYMPHOCYTES # BLD AUTO: 1.83 K/UL — SIGNIFICANT CHANGE UP (ref 1.2–3.4)
LYMPHOCYTES # BLD AUTO: 2.69 K/UL — SIGNIFICANT CHANGE UP (ref 1.2–3.4)
LYMPHOCYTES # BLD AUTO: 23 % — SIGNIFICANT CHANGE UP (ref 20.5–51.1)
LYMPHOCYTES # BLD AUTO: 30.7 % — SIGNIFICANT CHANGE UP (ref 20.5–51.1)
MAGNESIUM SERPL-MCNC: 2 MG/DL — SIGNIFICANT CHANGE UP (ref 1.8–2.4)
MCHC RBC-ENTMCNC: 28.5 PG — SIGNIFICANT CHANGE UP (ref 27–31)
MCHC RBC-ENTMCNC: 29 PG — SIGNIFICANT CHANGE UP (ref 27–31)
MCHC RBC-ENTMCNC: 33.2 G/DL — SIGNIFICANT CHANGE UP (ref 32–37)
MCHC RBC-ENTMCNC: 34 G/DL — SIGNIFICANT CHANGE UP (ref 32–37)
MCV RBC AUTO: 85.4 FL — SIGNIFICANT CHANGE UP (ref 80–94)
MCV RBC AUTO: 86.1 FL — SIGNIFICANT CHANGE UP (ref 80–94)
MONOCYTES # BLD AUTO: 0.56 K/UL — SIGNIFICANT CHANGE UP (ref 0.1–0.6)
MONOCYTES # BLD AUTO: 0.74 K/UL — HIGH (ref 0.1–0.6)
MONOCYTES NFR BLD AUTO: 7 % — SIGNIFICANT CHANGE UP (ref 1.7–9.3)
MONOCYTES NFR BLD AUTO: 8.4 % — SIGNIFICANT CHANGE UP (ref 1.7–9.3)
NEUTROPHILS # BLD AUTO: 5.07 K/UL — SIGNIFICANT CHANGE UP (ref 1.4–6.5)
NEUTROPHILS # BLD AUTO: 5.34 K/UL — SIGNIFICANT CHANGE UP (ref 1.4–6.5)
NEUTROPHILS NFR BLD AUTO: 57.8 % — SIGNIFICANT CHANGE UP (ref 42.2–75.2)
NEUTROPHILS NFR BLD AUTO: 67 % — SIGNIFICANT CHANGE UP (ref 42.2–75.2)
NRBC # BLD: 0 /100 WBCS — SIGNIFICANT CHANGE UP (ref 0–0)
NRBC # BLD: 0 /100 WBCS — SIGNIFICANT CHANGE UP (ref 0–0)
PHOSPHATE SERPL-MCNC: 3.3 MG/DL — SIGNIFICANT CHANGE UP (ref 2.1–4.9)
PLATELET # BLD AUTO: 302 K/UL — SIGNIFICANT CHANGE UP (ref 130–400)
PLATELET # BLD AUTO: 306 K/UL — SIGNIFICANT CHANGE UP (ref 130–400)
POTASSIUM SERPL-MCNC: 4.1 MMOL/L — SIGNIFICANT CHANGE UP (ref 3.5–5)
POTASSIUM SERPL-SCNC: 4.1 MMOL/L — SIGNIFICANT CHANGE UP (ref 3.5–5)
PROTHROM AB SERPL-ACNC: 11.4 SEC — SIGNIFICANT CHANGE UP (ref 9.95–12.87)
RBC # BLD: 4.31 M/UL — LOW (ref 4.7–6.1)
RBC # BLD: 4.52 M/UL — LOW (ref 4.7–6.1)
RBC # FLD: 12.4 % — SIGNIFICANT CHANGE UP (ref 11.5–14.5)
RBC # FLD: 12.5 % — SIGNIFICANT CHANGE UP (ref 11.5–14.5)
SODIUM SERPL-SCNC: 139 MMOL/L — SIGNIFICANT CHANGE UP (ref 135–146)
WBC # BLD: 7.97 K/UL — SIGNIFICANT CHANGE UP (ref 4.8–10.8)
WBC # BLD: 8.77 K/UL — SIGNIFICANT CHANGE UP (ref 4.8–10.8)
WBC # FLD AUTO: 7.97 K/UL — SIGNIFICANT CHANGE UP (ref 4.8–10.8)
WBC # FLD AUTO: 8.77 K/UL — SIGNIFICANT CHANGE UP (ref 4.8–10.8)

## 2019-07-21 PROCEDURE — 93010 ELECTROCARDIOGRAM REPORT: CPT

## 2019-07-21 PROCEDURE — 99232 SBSQ HOSP IP/OBS MODERATE 35: CPT

## 2019-07-21 RX ORDER — SODIUM CHLORIDE 9 MG/ML
1000 INJECTION INTRAMUSCULAR; INTRAVENOUS; SUBCUTANEOUS
Refills: 0 | Status: DISCONTINUED | OUTPATIENT
Start: 2019-07-21 | End: 2019-07-21

## 2019-07-21 RX ADMIN — Medication 600 MILLIGRAM(S): at 17:57

## 2019-07-21 RX ADMIN — Medication 650 MILLIGRAM(S): at 08:27

## 2019-07-21 RX ADMIN — Medication 650 MILLIGRAM(S): at 00:01

## 2019-07-21 RX ADMIN — Medication 600 MILLIGRAM(S): at 12:40

## 2019-07-21 RX ADMIN — Medication 600 MILLIGRAM(S): at 12:39

## 2019-07-21 RX ADMIN — Medication 650 MILLIGRAM(S): at 17:59

## 2019-07-21 RX ADMIN — Medication 600 MILLIGRAM(S): at 00:00

## 2019-07-21 RX ADMIN — Medication 650 MILLIGRAM(S): at 12:39

## 2019-07-21 RX ADMIN — Medication 650 MILLIGRAM(S): at 05:30

## 2019-07-21 RX ADMIN — Medication 600 MILLIGRAM(S): at 17:59

## 2019-07-21 RX ADMIN — Medication 650 MILLIGRAM(S): at 23:19

## 2019-07-21 RX ADMIN — Medication 600 MILLIGRAM(S): at 05:30

## 2019-07-21 RX ADMIN — HEPARIN SODIUM 5000 UNIT(S): 5000 INJECTION INTRAVENOUS; SUBCUTANEOUS at 17:58

## 2019-07-21 RX ADMIN — Medication 600 MILLIGRAM(S): at 23:19

## 2019-07-21 RX ADMIN — PANTOPRAZOLE SODIUM 40 MILLIGRAM(S): 20 TABLET, DELAYED RELEASE ORAL at 05:30

## 2019-07-21 RX ADMIN — Medication 600 MILLIGRAM(S): at 08:27

## 2019-07-21 RX ADMIN — HEPARIN SODIUM 5000 UNIT(S): 5000 INJECTION INTRAVENOUS; SUBCUTANEOUS at 05:31

## 2019-07-21 RX ADMIN — Medication 650 MILLIGRAM(S): at 12:40

## 2019-07-21 RX ADMIN — Medication 650 MILLIGRAM(S): at 17:57

## 2019-07-21 NOTE — PROGRESS NOTE ADULT - SUBJECTIVE AND OBJECTIVE BOX
GENERAL SURGERY PROGRESS NOTE    ***Spoke with patient via  ID: [id number]***    DEV BLAND  54y  Male  Hospital day :4d  POD:  Procedure:   OVERNIGHT EVENTS: No acute events.     T(F): 97.6 (07-21-19 @ 00:00), Max: 97.7 (07-20-19 @ 08:00)  HR: 58 (07-21-19 @ 00:00) (58 - 62)  BP: 129/61 (07-21-19 @ 00:00) (121/66 - 143/53)  ABP: --  ABP(mean): --  RR: 18 (07-21-19 @ 00:00) (18 - 18)  SpO2: --    DIET/FLUIDS:   NG:                                                                                DRAINS:     BM:     EMESIS:     URINE:      GI proph:  pantoprazole    Tablet 40 milliGRAM(s) Oral before breakfast    AC/ proph: heparin  Injectable 5000 Unit(s) SubCutaneous every 12 hours    ABx:     PHYSICAL EXAM:  GENERAL: NAD, well-appearing  CHEST/LUNG: Clear to auscultation bilaterally  HEART: Regular rate and rhythm  ABDOMEN: Soft, Nontender, Nondistended;   EXTREMITIES:  No clubbing, cyanosis, or edema      LABS  Labs:  CAPILLARY BLOOD GLUCOSE                              12.4   5.95  )-----------( 269      ( 19 Jul 2019 20:48 )             36.1         07-19    142  |  106  |  14  ----------------------------<  137<H>  4.2   |  23  |  0.8            RADIOLOGY & ADDITIONAL TESTS: no new studies

## 2019-07-21 NOTE — PROGRESS NOTE ADULT - ASSESSMENT
Plan:   - OR on monday with Ortho - f/u to ensure this is the plan   - pre-op patient on sunday and make NPO at midnight   - Disposition - patient lives at home with 31 year old daughter who is home most of the day, they have three steps entering the house but no other steps. Determine mobility requirements/equipment

## 2019-07-21 NOTE — CHART NOTE - NSCHARTNOTEFT_GEN_A_CORE
Spoke to patient at 11AM via  phone ( #: [id number], name: Betty).   He states that his pain is well controlled. He states is having Bowel movements and is voiding freely  We discussed his current condition, and we are waiting on final decision from orthopedics team of when will he go for surgery.  All questions answered at this time.

## 2019-07-22 LAB
ANION GAP SERPL CALC-SCNC: 13 MMOL/L — SIGNIFICANT CHANGE UP (ref 7–14)
ANION GAP SERPL CALC-SCNC: 15 MMOL/L — HIGH (ref 7–14)
BASOPHILS # BLD AUTO: 0.04 K/UL — SIGNIFICANT CHANGE UP (ref 0–0.2)
BASOPHILS NFR BLD AUTO: 0.5 % — SIGNIFICANT CHANGE UP (ref 0–1)
BUN SERPL-MCNC: 14 MG/DL — SIGNIFICANT CHANGE UP (ref 10–20)
BUN SERPL-MCNC: 15 MG/DL — SIGNIFICANT CHANGE UP (ref 10–20)
CALCIUM SERPL-MCNC: 9.4 MG/DL — SIGNIFICANT CHANGE UP (ref 8.5–10.1)
CALCIUM SERPL-MCNC: 9.9 MG/DL — SIGNIFICANT CHANGE UP (ref 8.5–10.1)
CHLORIDE SERPL-SCNC: 101 MMOL/L — SIGNIFICANT CHANGE UP (ref 98–110)
CHLORIDE SERPL-SCNC: 102 MMOL/L — SIGNIFICANT CHANGE UP (ref 98–110)
CO2 SERPL-SCNC: 22 MMOL/L — SIGNIFICANT CHANGE UP (ref 17–32)
CO2 SERPL-SCNC: 24 MMOL/L — SIGNIFICANT CHANGE UP (ref 17–32)
CREAT SERPL-MCNC: 0.7 MG/DL — SIGNIFICANT CHANGE UP (ref 0.7–1.5)
CREAT SERPL-MCNC: 0.7 MG/DL — SIGNIFICANT CHANGE UP (ref 0.7–1.5)
EOSINOPHIL # BLD AUTO: 0.16 K/UL — SIGNIFICANT CHANGE UP (ref 0–0.7)
EOSINOPHIL NFR BLD AUTO: 1.8 % — SIGNIFICANT CHANGE UP (ref 0–8)
GLUCOSE SERPL-MCNC: 105 MG/DL — HIGH (ref 70–99)
GLUCOSE SERPL-MCNC: 115 MG/DL — HIGH (ref 70–99)
HCT VFR BLD CALC: 38.1 % — LOW (ref 42–52)
HGB BLD-MCNC: 13 G/DL — LOW (ref 14–18)
IMM GRANULOCYTES NFR BLD AUTO: 0.3 % — SIGNIFICANT CHANGE UP (ref 0.1–0.3)
LYMPHOCYTES # BLD AUTO: 2.42 K/UL — SIGNIFICANT CHANGE UP (ref 1.2–3.4)
LYMPHOCYTES # BLD AUTO: 27.4 % — SIGNIFICANT CHANGE UP (ref 20.5–51.1)
MAGNESIUM SERPL-MCNC: 2 MG/DL — SIGNIFICANT CHANGE UP (ref 1.8–2.4)
MAGNESIUM SERPL-MCNC: 2.2 MG/DL — SIGNIFICANT CHANGE UP (ref 1.8–2.4)
MCHC RBC-ENTMCNC: 28.8 PG — SIGNIFICANT CHANGE UP (ref 27–31)
MCHC RBC-ENTMCNC: 34.1 G/DL — SIGNIFICANT CHANGE UP (ref 32–37)
MCV RBC AUTO: 84.5 FL — SIGNIFICANT CHANGE UP (ref 80–94)
MONOCYTES # BLD AUTO: 0.78 K/UL — HIGH (ref 0.1–0.6)
MONOCYTES NFR BLD AUTO: 8.8 % — SIGNIFICANT CHANGE UP (ref 1.7–9.3)
NEUTROPHILS # BLD AUTO: 5.41 K/UL — SIGNIFICANT CHANGE UP (ref 1.4–6.5)
NEUTROPHILS NFR BLD AUTO: 61.2 % — SIGNIFICANT CHANGE UP (ref 42.2–75.2)
NRBC # BLD: 0 /100 WBCS — SIGNIFICANT CHANGE UP (ref 0–0)
PHOSPHATE SERPL-MCNC: 3.8 MG/DL — SIGNIFICANT CHANGE UP (ref 2.1–4.9)
PHOSPHATE SERPL-MCNC: 4.4 MG/DL — SIGNIFICANT CHANGE UP (ref 2.1–4.9)
PLATELET # BLD AUTO: 323 K/UL — SIGNIFICANT CHANGE UP (ref 130–400)
POTASSIUM SERPL-MCNC: 3.8 MMOL/L — SIGNIFICANT CHANGE UP (ref 3.5–5)
POTASSIUM SERPL-MCNC: 4.3 MMOL/L — SIGNIFICANT CHANGE UP (ref 3.5–5)
POTASSIUM SERPL-SCNC: 3.8 MMOL/L — SIGNIFICANT CHANGE UP (ref 3.5–5)
POTASSIUM SERPL-SCNC: 4.3 MMOL/L — SIGNIFICANT CHANGE UP (ref 3.5–5)
RBC # BLD: 4.51 M/UL — LOW (ref 4.7–6.1)
RBC # FLD: 12.3 % — SIGNIFICANT CHANGE UP (ref 11.5–14.5)
SODIUM SERPL-SCNC: 138 MMOL/L — SIGNIFICANT CHANGE UP (ref 135–146)
SODIUM SERPL-SCNC: 139 MMOL/L — SIGNIFICANT CHANGE UP (ref 135–146)
WBC # BLD: 8.84 K/UL — SIGNIFICANT CHANGE UP (ref 4.8–10.8)
WBC # FLD AUTO: 8.84 K/UL — SIGNIFICANT CHANGE UP (ref 4.8–10.8)

## 2019-07-22 PROCEDURE — 99232 SBSQ HOSP IP/OBS MODERATE 35: CPT

## 2019-07-22 RX ADMIN — PANTOPRAZOLE SODIUM 40 MILLIGRAM(S): 20 TABLET, DELAYED RELEASE ORAL at 05:26

## 2019-07-22 RX ADMIN — Medication 650 MILLIGRAM(S): at 00:40

## 2019-07-22 RX ADMIN — Medication 650 MILLIGRAM(S): at 23:28

## 2019-07-22 RX ADMIN — Medication 650 MILLIGRAM(S): at 17:07

## 2019-07-22 RX ADMIN — Medication 600 MILLIGRAM(S): at 23:27

## 2019-07-22 RX ADMIN — Medication 650 MILLIGRAM(S): at 14:03

## 2019-07-22 RX ADMIN — Medication 600 MILLIGRAM(S): at 17:07

## 2019-07-22 RX ADMIN — Medication 650 MILLIGRAM(S): at 05:26

## 2019-07-22 RX ADMIN — Medication 600 MILLIGRAM(S): at 00:40

## 2019-07-22 RX ADMIN — Medication 650 MILLIGRAM(S): at 23:27

## 2019-07-22 RX ADMIN — HEPARIN SODIUM 5000 UNIT(S): 5000 INJECTION INTRAVENOUS; SUBCUTANEOUS at 05:26

## 2019-07-22 RX ADMIN — Medication 600 MILLIGRAM(S): at 23:28

## 2019-07-22 RX ADMIN — Medication 600 MILLIGRAM(S): at 14:03

## 2019-07-22 RX ADMIN — Medication 600 MILLIGRAM(S): at 05:26

## 2019-07-22 RX ADMIN — HEPARIN SODIUM 5000 UNIT(S): 5000 INJECTION INTRAVENOUS; SUBCUTANEOUS at 17:07

## 2019-07-22 NOTE — PROGRESS NOTE ADULT - ASSESSMENT
Plan:   - patient now not going to OR per ortho   - Dispo/SW Plan:   - patient now not going to OR per ortho   - Dispo/SW   - return with  phone

## 2019-07-22 NOTE — CHART NOTE - NSCHARTNOTEFT_GEN_A_CORE
The patient has a mobility limitation that significantly impairs the patients ability to participate in one or more MRADLs such as toileting, eating, dressing, and bathing in customary locations in the home. The patient was diagnosed with left fibular fracture and right calcaneal and navicular fracture, the patient is Non-weightbearing on BL lower extremities.  The patients home provides adequate access between rooms for the use of the manual wheelchair. The wheelchair will significantly improve the patients ability to participate in MRADLs and will be used on a regular basis in the home. The patients mobility limitation cannot be resolved by the use of a walker or cane because he is non-weight bearing on bilateral lower extremities. The patient has sufficient upper extremity function to safely propel the manual wheelchair in the home during a typical day. The patient has agreed to use the manual wheelchair that is provided in the home.

## 2019-07-22 NOTE — PROGRESS NOTE ADULT - SUBJECTIVE AND OBJECTIVE BOX
GENERAL SURGERY PROGRESS NOTE     DEV BLAND  54y  Male  Hospital day :5d  POD:  Procedure:   OVERNIGHT EVENTS: No acute events. Patient having BM and passing gas.     T(F): 97.6 (07-22-19 @ 00:00), Max: 97.6 (07-22-19 @ 00:00)  HR: 55 (07-22-19 @ 00:00) (55 - 60)  BP: 145/69 (07-22-19 @ 00:00) (121/65 - 145/69)  ABP: --  ABP(mean): --  RR: 18 (07-22-19 @ 00:00) (18 - 18)  SpO2: --         GI proph:  pantoprazole    Tablet 40 milliGRAM(s) Oral before breakfast    AC/ proph: heparin  Injectable 5000 Unit(s) SubCutaneous every 12 hours    ABx:     PHYSICAL EXAM:  GENERAL: NAD, well-appearing  CHEST/LUNG: Clear to auscultation bilaterally  HEART: Regular rate and rhythm  ABDOMEN: Soft, Nontender, Nondistended;   EXTREMITIES:  No clubbing, cyanosis, or edema      LABS  Labs:  CAPILLARY BLOOD GLUCOSE                              12.5   8.77  )-----------( 302      ( 21 Jul 2019 22:40 )             36.8       Auto Neutrophil %: 57.8 % (07-21-19 @ 22:40)  Auto Immature Granulocyte %: 0.2 % (07-21-19 @ 22:40)  Auto Neutrophil %: 67.0 % (07-21-19 @ 12:09)  Auto Immature Granulocyte %: 0.4 % (07-21-19 @ 12:09)    07-21    139  |  102  |  15  ----------------------------<  115<H>  3.8   |  22  |  0.7      Calcium, Total Serum: 9.4 mg/dL (07-21-19 @ 22:40)      LFTs:         Coags:     11.40  ----< 0.99    ( 21 Jul 2019 12:09 )     29.3                        RADIOLOGY & ADDITIONAL TESTS: no new studies

## 2019-07-23 VITALS
DIASTOLIC BLOOD PRESSURE: 62 MMHG | RESPIRATION RATE: 18 BRPM | HEART RATE: 60 BPM | TEMPERATURE: 97 F | SYSTOLIC BLOOD PRESSURE: 125 MMHG

## 2019-07-23 PROCEDURE — 99231 SBSQ HOSP IP/OBS SF/LOW 25: CPT

## 2019-07-23 RX ORDER — IBUPROFEN 200 MG
1 TABLET ORAL
Qty: 0 | Refills: 0 | DISCHARGE
Start: 2019-07-23

## 2019-07-23 RX ORDER — ACETAMINOPHEN 500 MG
2 TABLET ORAL
Qty: 0 | Refills: 0 | DISCHARGE
Start: 2019-07-23

## 2019-07-23 RX ADMIN — Medication 600 MILLIGRAM(S): at 05:11

## 2019-07-23 RX ADMIN — Medication 600 MILLIGRAM(S): at 05:10

## 2019-07-23 RX ADMIN — Medication 600 MILLIGRAM(S): at 11:49

## 2019-07-23 RX ADMIN — Medication 600 MILLIGRAM(S): at 17:57

## 2019-07-23 RX ADMIN — Medication 650 MILLIGRAM(S): at 05:11

## 2019-07-23 RX ADMIN — Medication 650 MILLIGRAM(S): at 18:00

## 2019-07-23 RX ADMIN — PANTOPRAZOLE SODIUM 40 MILLIGRAM(S): 20 TABLET, DELAYED RELEASE ORAL at 08:22

## 2019-07-23 RX ADMIN — HEPARIN SODIUM 5000 UNIT(S): 5000 INJECTION INTRAVENOUS; SUBCUTANEOUS at 18:00

## 2019-07-23 RX ADMIN — HEPARIN SODIUM 5000 UNIT(S): 5000 INJECTION INTRAVENOUS; SUBCUTANEOUS at 05:10

## 2019-07-23 RX ADMIN — Medication 600 MILLIGRAM(S): at 18:00

## 2019-07-23 RX ADMIN — Medication 650 MILLIGRAM(S): at 11:50

## 2019-07-23 RX ADMIN — Medication 650 MILLIGRAM(S): at 17:57

## 2019-07-23 RX ADMIN — Medication 600 MILLIGRAM(S): at 11:50

## 2019-07-23 RX ADMIN — Medication 650 MILLIGRAM(S): at 11:49

## 2019-07-23 RX ADMIN — Medication 650 MILLIGRAM(S): at 05:10

## 2019-07-23 NOTE — PROGRESS NOTE ADULT - ATTENDING COMMENTS
awaiting or with ortho.  Assessment and plan above were modified and discussed with residents, physician assistants, and nurses.
awaiting ortho fixation of fibula, pt ot dispo
discharge planning
pt for ambulation
calcaneus fx on ct, contralateral to fibula  f/u ortho  pain control
discharge planning   Assessment and plan above were modified and discussed with residents, physician assistants, and nurses.

## 2019-07-23 NOTE — DISCHARGE NOTE NURSING/CASE MANAGEMENT/SOCIAL WORK - NSDCDPATPORTLINK_GEN_ALL_CORE
You can access the [facility] Patient Portal, offered by [facility], by registering with the following website: [email or web address]

## 2019-07-23 NOTE — PROGRESS NOTE ADULT - SUBJECTIVE AND OBJECTIVE BOX
GENERAL SURGERY PROGRESS NOTE     DEV BLAND  54y  Male  Hospital day :6d  POD:  Procedure:   OVERNIGHT EVENTS: no acute events     T(F): 96.2 (07-23-19 @ 00:19), Max: 97.4 (07-22-19 @ 16:00)  HR: 56 (07-23-19 @ 00:19) (56 - 62)  BP: 138/77 (07-23-19 @ 00:19) (118/67 - 138/77)  ABP: --  ABP(mean): --  RR: 18 (07-23-19 @ 00:19) (18 - 18)  SpO2: --         GI proph:  pantoprazole    Tablet 40 milliGRAM(s) Oral before breakfast    AC/ proph: heparin  Injectable 5000 Unit(s) SubCutaneous every 12 hours    ABx:     PHYSICAL EXAM:  GENERAL: NAD, well-appearing  CHEST/LUNG: Clear to auscultation bilaterally  HEART: Regular rate and rhythm  ABDOMEN: Soft, Nontender, Nondistended;   EXTREMITIES:  No clubbing, cyanosis, or edema      LABS  Labs:  CAPILLARY BLOOD GLUCOSE                              13.0   8.84  )-----------( 323      ( 22 Jul 2019 21:51 )             38.1       Auto Immature Granulocyte %: 0.3 % (07-22-19 @ 21:51)  Auto Neutrophil %: 61.2 % (07-22-19 @ 21:51)    07-22    138  |  101  |  14  ----------------------------<  105<H>  4.3   |  24  |  0.7      Calcium, Total Serum: 9.9 mg/dL (07-22-19 @ 21:51)      LFTs:         Coags:     11.40  ----< 0.99    ( 21 Jul 2019 12:09 )     29.3

## 2019-07-23 NOTE — PROGRESS NOTE ADULT - REASON FOR ADMISSION
Fall from 12 ft

## 2019-07-23 NOTE — DISCHARGE NOTE PROVIDER - HOSPITAL COURSE
53M with no PMH who presents after a 12 foot fall with b/l foot, ankle, and R knee pain found to have left distal fibula fracture and right calcaneus and navicular fracture. Orthopedic surgery and podiatry recommendations were NWB LLE with splint precautions and NWB RLE with CAM boot with wheel chair transfers. During the admission, the pt was evaluated by occupational therapy, physical therapy, and physiatry with recommendations for home with VNS. Scripts were given for a wheelchair, commode, and transfer board, which will be paid for out of pocket by the patient's family. At the time of discharge, the patient was medically stable.

## 2019-07-23 NOTE — OCCUPATIONAL THERAPY INITIAL EVALUATION ADULT - GENERAL OBSERVATIONS, REHAB EVAL
Pt. seen from 9:05-9:37. Pt encountered in bed in NAD and cooperative to OT tx. OT spoke with Pt'new and sister ans educated them on Pt's B LE NWB status and use of pop-over transfers and sliding board as needed. Good understanding and in agreement.

## 2019-07-23 NOTE — DISCHARGE NOTE PROVIDER - CARE PROVIDERS DIRECT ADDRESSES
[email or web address]

## 2019-07-23 NOTE — DISCHARGE NOTE PROVIDER - PROVIDER TOKENS
PROVIDER:[TOKEN:[[id number]],FOLLOWUP:[1 week]],PROVIDER:[TOKEN:[[id number]],FOLLOWUP:[1 week]]

## 2019-07-23 NOTE — CHART NOTE - NSCHARTNOTEFT_GEN_A_CORE
Patient was spoken to using  number [id number].    Patient worked with OT today and feels comfortable transferring to wheelchair independently. Patient feels he ready to go home. Due to mechanism of injury patient is working with a  to have medical bills paid for by Worker's Compensation. Family was advised by  today that it may take up to 2 weeks to completely process the claim in order to have home healthcare equipment paid for. Patient and family are aware that this will delay the patient's discharge and were advised that equipment will cost about $300 if they were to pay for it out of pocket and be reimbursed. They are hesitant to make this purchase due to financial burden but do not wish to prolong the hospital stay. Will follow up with family later this afternoon regarding their decision if they will be able to purchase the necessary equipment patient will need to safely discharge.

## 2019-07-23 NOTE — DISCHARGE NOTE PROVIDER - NSDCCPCAREPLAN_GEN_ALL_CORE_FT
PRINCIPAL DISCHARGE DIAGNOSIS  Diagnosis: Fibula fracture  Assessment and Plan of Treatment: Left distal fibular fracture, right comminuted calcaneal fracture, and right navicular fracture. Please continue to wear your CAM boot. Do not bear weight on either of your legs until following up with your doctors. Continue to practice safe transfers from bed to commode or wheelchair as taught by occupational therapy.  Pain: Take tylenol and motrin as needed for pain.   Follow up: with the orthopedic surgeon Dr. Calloway and podiatrist Dr. Lauren mccallum in 1-2 weeks.      SECONDARY DISCHARGE DIAGNOSES  Diagnosis: Navicular fracture, foot  Assessment and Plan of Treatment: Right side, see above.    Diagnosis: Calcaneal fracture  Assessment and Plan of Treatment: Right side, see above.

## 2019-07-30 DIAGNOSIS — Z88.0 ALLERGY STATUS TO PENICILLIN: ICD-10-CM

## 2019-07-30 DIAGNOSIS — Q66.89 OTHER SPECIFIED CONGENITAL DEFORMITIES OF FEET: ICD-10-CM

## 2019-07-30 DIAGNOSIS — S92.211A DISPLACED FRACTURE OF CUBOID BONE OF RIGHT FOOT, INITIAL ENCOUNTER FOR CLOSED FRACTURE: ICD-10-CM
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
## 2019-07-30 DIAGNOSIS — R26.89 OTHER ABNORMALITIES OF GAIT AND MOBILITY: ICD-10-CM

## 2019-07-30 DIAGNOSIS — S92.021A DISPLACED FRACTURE OF ANTERIOR PROCESS OF RIGHT CALCANEUS, INITIAL ENCOUNTER FOR CLOSED FRACTURE: ICD-10-CM

## 2019-07-30 DIAGNOSIS — S92.151A DISPLACED AVULSION FRACTURE (CHIP FRACTURE) OF RIGHT TALUS, INITIAL ENCOUNTER FOR CLOSED FRACTURE: ICD-10-CM

## 2019-07-30 DIAGNOSIS — S82.62XA DISPLACED FRACTURE OF LATERAL MALLEOLUS OF LEFT FIBULA, INITIAL ENCOUNTER FOR CLOSED FRACTURE: ICD-10-CM

## 2019-07-30 DIAGNOSIS — S92.251A DISPLACED FRACTURE OF NAVICULAR [SCAPHOID] OF RIGHT FOOT, INITIAL ENCOUNTER FOR CLOSED FRACTURE: ICD-10-CM
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
## 2019-07-30 DIAGNOSIS — G71.2 CONGENITAL MYOPATHIES: ICD-10-CM

## 2019-07-30 DIAGNOSIS — S93.421A SPRAIN OF DELTOID LIGAMENT OF RIGHT ANKLE, INITIAL ENCOUNTER: ICD-10-CM

## 2019-07-30 DIAGNOSIS — S93.491A SPRAIN OF OTHER LIGAMENT OF RIGHT ANKLE, INITIAL ENCOUNTER: ICD-10-CM

## 2019-12-06 NOTE — PROGRESS NOTE ADULT - SUBJECTIVE AND OBJECTIVE BOX
[facility]  Progress Note     Patoolee Lamont Patient Status:  Inpatient    4/15/1952 MRN [id number]   [facility] 3SW-A Attending Jessica Alvarado MD   Hosp Day # 3 PCP Sherrell Sanchez MD     Chief Complaint: Ileus    S: Patient GENERAL SURGERY PROGRESS NOTE     DEV BLAND  53y  Male  Hospital day :1d  POD:  Procedure:   OVERNIGHT EVENTS: No acute events, patient in no pain at rest     T(F): 96.1 (07-18-19 @ 08:06), Max: 98.3 (07-17-19 @ 16:13)  HR: 59 (07-18-19 @ 08:06) (59 - 69)  BP: 108/55 (07-18-19 @ 08:06) (108/55 - 114/61)  ABP: --  ABP(mean): --  RR: 18 (07-18-19 @ 08:06) (18 - 18)  SpO2: --        BM: 2 days ago         URINE: yes     GI proph:  pantoprazole    Tablet 40 milliGRAM(s) Oral before breakfast    AC/ proph: heparin  Injectable 5000 Unit(s) SubCutaneous every 12 hours    ABx:     PHYSICAL EXAM:  GENERAL: NAD, well-appearing  CHEST/LUNG: Clear to auscultation bilaterally  HEART: Regular rate and rhythm  ABDOMEN: Soft, Nontender, Nondistended;   EXTREMITIES:  No clubbing, cyanosis, or edema      LABS  Labs:  CAPILLARY BLOOD GLUCOSE                              12.6   8.21  )-----------( 228      ( 17 Jul 2019 21:37 )             37.1       Auto Neutrophil %: 61.3 % (07-17-19 @ 21:37)  Auto Immature Granulocyte %: 0.4 % (07-17-19 @ 21:37)    07-17    140  |  103  |  14  ----------------------------<  125<H>  3.8   |  24  |  0.7      Calcium, Total Serum: 9.1 mg/dL (07-17-19 @ 21:37)      LFTs:             7.3  | 0.3  | 36       ------------------[89      ( 16 Jul 2019 18:30 )  4.5  | x    | 18          Lipase:28     Amylase:x         Lactate, Blood: 2.1 mmol/L (07-16-19 @ 18:30)      Coags:     11.00  ----< 0.96    ( 16 Jul 2019 18:30 )     25.6                        RADIOLOGY & ADDITIONAL TESTS: no new studies Mononitrate ER  120 mg Oral Daily   • acetaminophen  1,000 mg Oral Q6H   • atorvastatin  40 mg Oral Nightly   • cetirizine  10 mg Oral Daily   • gabapentin  600 mg Oral BID   • multivitamin  1 tablet Oral Daily   • Pantoprazole Sodium  40 mg Oral QAM AC

## 2023-01-19 NOTE — PATIENT PROFILE ADULT - NSPROSPHOSPCHAPLAINYN_GEN_A_NUR
Problem: MOBILITY - ADULT  Goal: Maintain or return to baseline ADL function  Description: INTERVENTIONS:  -  Assess patient's ability to carry out ADLs; assess patient's baseline for ADL function and identify physical deficits which impact ability to perform ADLs (bathing, care of mouth/teeth, toileting, grooming, dressing, etc )  - Assess/evaluate cause of self-care deficits   - Assess range of motion  - Assess patient's mobility; develop plan if impaired  - Assess patient's need for assistive devices and provide as appropriate  - Encourage maximum independence but intervene and supervise when necessary  - Involve family in performance of ADLs  - Assess for home care needs following discharge   - Consider OT consult to assist with ADL evaluation and planning for discharge  - Provide patient education as appropriate  Outcome: Progressing     Problem: Prexisting or High Potential for Compromised Skin Integrity  Goal: Skin integrity is maintained or improved  Description: INTERVENTIONS:  - Identify patients at risk for skin breakdown  - Assess and monitor skin integrity  - Assess and monitor nutrition and hydration status  - Monitor labs   - Assess for incontinence   - Turn and reposition patient  - Assist with mobility/ambulation  - Relieve pressure over bony prominences  - Avoid friction and shearing  - Provide appropriate hygiene as needed including keeping skin clean and dry  - Evaluate need for skin moisturizer/barrier cream  - Collaborate with interdisciplinary team   - Patient/family teaching  - Consider wound care consult   Outcome: Progressing     Problem: PAIN - ADULT  Goal: Verbalizes/displays adequate comfort level or baseline comfort level  Description: Interventions:  - Encourage patient to monitor pain and request assistance  - Assess pain using appropriate pain scale  - Administer analgesics based on type and severity of pain and evaluate response  - Implement non-pharmacological measures as appropriate and evaluate response  - Consider cultural and social influences on pain and pain management  - Notify physician/advanced practitioner if interventions unsuccessful or patient reports new pain  Outcome: Progressing     Problem: INFECTION - ADULT  Goal: Absence or prevention of progression during hospitalization  Description: INTERVENTIONS:  - Assess and monitor for signs and symptoms of infection  - Monitor lab/diagnostic results  - Monitor all insertion sites, i e  indwelling lines, tubes, and drains  - Monitor endotracheal if appropriate and nasal secretions for changes in amount and color  - Likely appropriate cooling/warming therapies per order  - Administer medications as ordered  - Instruct and encourage patient and family to use good hand hygiene technique  - Identify and instruct in appropriate isolation precautions for identified infection/condition  Outcome: Not Progressing     Problem: SAFETY ADULT  Goal: Maintain or return to baseline ADL function  Description: INTERVENTIONS:  -  Assess patient's ability to carry out ADLs; assess patient's baseline for ADL function and identify physical deficits which impact ability to perform ADLs (bathing, care of mouth/teeth, toileting, grooming, dressing, etc )  - Assess/evaluate cause of self-care deficits   - Assess range of motion  - Assess patient's mobility; develop plan if impaired  - Assess patient's need for assistive devices and provide as appropriate  - Encourage maximum independence but intervene and supervise when necessary  - Involve family in performance of ADLs  - Assess for home care needs following discharge   - Consider OT consult to assist with ADL evaluation and planning for discharge  - Provide patient education as appropriate  Outcome: Progressing no

## 2023-11-17 NOTE — DISCHARGE NOTE PROVIDER - NSDCCONDITION_GEN_ALL_CORE
23  Elissa Valle           : 1976   Sex: male                            Age 49 Stable

## 2023-11-27 NOTE — DISCHARGE NOTE PROVIDER - CARE PROVIDER_API CALL
Venipuncture performed with 23 gauge butterfly, x's 1 attempt,  to L Antecubital vein.  Specimens collected per orders.      Pressure dressing applied to site, instructed patient to remove dressing in 10-15 minutes, OK to re-adjust dressing if pressure causing any discomfort, to observe closely for numbness and/or discoloration to hand or fingers, and to notify provider if bleeding persists after applying constant pressure lasting 30 minutes.                 la     Tova Calloway (MD)  Surgery of the Hand  [address] [place] [address]  Phone: [phone]  Fax: [phone]  Follow Up Time: 1 week    Jey Aguilar)  Podiatric Medicine  [address] [place] [address] [facility] [place] [address]  Phone: [phone]  Fax: [phone]  Follow Up Time: 1 week